# Patient Record
Sex: MALE | Race: WHITE | NOT HISPANIC OR LATINO
[De-identification: names, ages, dates, MRNs, and addresses within clinical notes are randomized per-mention and may not be internally consistent; named-entity substitution may affect disease eponyms.]

---

## 2017-12-21 ENCOUNTER — APPOINTMENT (OUTPATIENT)
Dept: SURGICAL ONCOLOGY | Facility: CLINIC | Age: 62
End: 2017-12-21
Payer: COMMERCIAL

## 2017-12-21 VITALS
HEART RATE: 54 BPM | SYSTOLIC BLOOD PRESSURE: 115 MMHG | RESPIRATION RATE: 14 BRPM | OXYGEN SATURATION: 98 % | WEIGHT: 213 LBS | DIASTOLIC BLOOD PRESSURE: 72 MMHG | HEIGHT: 73 IN | BODY MASS INDEX: 28.23 KG/M2

## 2017-12-21 PROCEDURE — 99214 OFFICE O/P EST MOD 30 MIN: CPT

## 2018-05-04 ENCOUNTER — TRANSCRIPTION ENCOUNTER (OUTPATIENT)
Age: 63
End: 2018-05-04

## 2019-01-04 ENCOUNTER — TRANSCRIPTION ENCOUNTER (OUTPATIENT)
Age: 64
End: 2019-01-04

## 2019-02-19 ENCOUNTER — APPOINTMENT (OUTPATIENT)
Dept: SURGICAL ONCOLOGY | Facility: CLINIC | Age: 64
End: 2019-02-19
Payer: COMMERCIAL

## 2019-02-19 VITALS
HEIGHT: 73 IN | DIASTOLIC BLOOD PRESSURE: 77 MMHG | BODY MASS INDEX: 31.81 KG/M2 | SYSTOLIC BLOOD PRESSURE: 152 MMHG | OXYGEN SATURATION: 98 % | HEART RATE: 68 BPM | WEIGHT: 240 LBS

## 2019-02-19 DIAGNOSIS — C43.61 MALIGNANT MELANOMA OF RIGHT UPPER LIMB, INCLUDING SHOULDER: ICD-10-CM

## 2019-02-19 PROCEDURE — 99214 OFFICE O/P EST MOD 30 MIN: CPT

## 2019-02-19 NOTE — HISTORY OF PRESENT ILLNESS
[de-identified] : Bello is a 63 year old male presents for a yearly melanoma follow up visit. History significant for resection of right forearm T1 (0.2 mm) melanoma in December 2011.  Final pathology revealed negative margins. \par \par INTERVAL HISTORY:\par 2/19/19 -  He continues ongoing dermatologic surveillance with Dr. Stearns and reports exam in November 2018 was unremarkable. Denies new skin lesions or masses. Denies bleeding or pruritic moles. Reports a dry patch of skin on his left forehead.  He continues to avoid the sun and applies sunblock.  He was diagnosed with PLS.  \par \par Derm: Dr. Stearns

## 2019-02-19 NOTE — ASSESSMENT
[FreeTextEntry1] : IMP:\par S/p resection of right forearm T1 (0.2 mm) melanoma in December 2011\par No clinical evidence of local or regional recurrence\par \par \par PLAN:\par RTO PRN\par Continue ongoing dermatological surveillance with Dr. Stearns

## 2019-02-19 NOTE — PHYSICAL EXAM
[Normal] : supple, no neck mass and thyroid not enlarged [Normal Neck Lymph Nodes] : normal neck lymph nodes  [Normal Supraclavicular Lymph Nodes] : normal supraclavicular lymph nodes [Normal Axillary Lymph Nodes] : normal axillary lymph nodes [Normal] : oriented to person, place and time, with appropriate affect [de-identified] : no evidence of local recurrence right forearm; well healed incision

## 2019-06-20 ENCOUNTER — APPOINTMENT (OUTPATIENT)
Dept: ORTHOPEDIC SURGERY | Facility: CLINIC | Age: 64
End: 2019-06-20
Payer: COMMERCIAL

## 2019-06-20 VITALS — HEIGHT: 74 IN | WEIGHT: 236 LBS | BODY MASS INDEX: 30.29 KG/M2

## 2019-06-20 VITALS — WEIGHT: 236 LBS | BODY MASS INDEX: 30.29 KG/M2 | HEIGHT: 74 IN

## 2019-06-20 PROCEDURE — 73562 X-RAY EXAM OF KNEE 3: CPT | Mod: LT

## 2019-06-20 PROCEDURE — 99204 OFFICE O/P NEW MOD 45 MIN: CPT

## 2019-06-20 NOTE — DISCUSSION/SUMMARY
[de-identified] : We talked about the nature of the condition and treatment options. At this point I a recommending therapy. A prescription for Physical Therapy was provided. Home exercises for ROM, strength and flexibility were reviewed. The benefits of hyaluronic acid injections were reviewed. Ice and anti-inflammatories were discussed. viscosupplimentation is a possibility. \par \par FU in 4 weeks

## 2019-06-20 NOTE — PHYSICAL EXAM
[de-identified] : Right Lower Extremity\par o Buttock : no tenderness, swelling or deformities \par o Right Hip :\par ¦ Inspection/Palpation : no tenderness, swelling or deformities\par ¦ Range of Motion : Full felxion, limited rotation\par ¦ Stability : joint stability intact\par ¦ Strength : extension, flexion, adduction, abduction, internal rotation and external rotation 3-/5 \par o Right Knee :\par ¦ Inspection/Palpation : no tenderness to palpation, no swelling\par ¦ Range of Motion : Actively he can almost get to full extension, full flexion/extension passively, no crepitance\par ¦ Stability : no valgus or varus instability present on provocative testing\par ¦ Strength : flexion and extension 5/5\par ¦ Tests and Signs : all tests for stability normal \par \par Left Lower Extremity\par o Buttock : no tenderness, swelling or deformities \par o Left Hip :\par ¦ Inspection/Palpation : no tenderness, no swelling or deformities\par ¦ Range of Motion : full and painless in all planes, no crepitance\par ¦ Stability : joint stability intact\par ¦ Strength : extension, flexion, adduction, abduction, internal rotation and external rotation 4+/5\par o Left Knee :\par ¦ Inspection/Palpation : no tenderness to palpation, no swelling\par ¦ Range of Motion : active flexion and extension full and painless, mild  crepitance\par ¦ Stability : no valgus or varus instability present on provocative testing\par ¦ Strength : flexion and extension 4+/5\par ¦ Tests and Signs : all tests for stability normal\par \par Gait:\par ambulating with a quad cane on the left, AFO on the right foot., tight hamstrings and quads  on the left>right.  \par \par Radiology Results\par o Left knee: AP, lateral, and skyline and tunnel views show significant lateral and patellofemoral osteoarthritis

## 2019-06-20 NOTE — HISTORY OF PRESENT ILLNESS
[de-identified] : 64 year old male with a history of primary lateral sclerosis that was diagnosed 1.5 years ago, presents with pain in left knee onset 6 months ago. He experiences no pain at night while he sleeps. No pain with walking, but experiences pain going up stairs. However, it takes him a while to get moving when he's been sitting for an extended period of time. He feels as if the knee can buckle if he walks for too long, and is experiencing extreme difficulty moving his left leg, as well as issues with spasticity. He is attending PT for the pain, but feels as if it hasn't helped significantly. He has a has had a cardiac ablation, and takes metoprolol. He ambulates with a cane on the left with AFO on the right foot.

## 2019-06-20 NOTE — ADDENDUM
[FreeTextEntry1] : I, Catalino Toy, acted solely as a scribe for Dr. Bobby Guallpa on this date 06/20/2019. \par \par All medical record entries made by the Scribe were at my, Dr. Bobby Guallpa, direction and personally dictated by me on 06/20/2019. I have reviewed the chart and agree that the record accurately reflects my personal performance of the history, physical exam, assessment and plan. I have also personally directed, reviewed, and agreed with the chart.

## 2019-07-03 ENCOUNTER — APPOINTMENT (OUTPATIENT)
Dept: ORTHOPEDIC SURGERY | Facility: CLINIC | Age: 64
End: 2019-07-03
Payer: COMMERCIAL

## 2019-07-03 VITALS — BODY MASS INDEX: 30.29 KG/M2 | HEIGHT: 74 IN | WEIGHT: 236 LBS

## 2019-07-03 PROCEDURE — 73030 X-RAY EXAM OF SHOULDER: CPT | Mod: RT

## 2019-07-03 PROCEDURE — 99214 OFFICE O/P EST MOD 30 MIN: CPT

## 2019-07-03 NOTE — HISTORY OF PRESENT ILLNESS
[de-identified] : 64 year old male presents with right shoulder pain. He states that he fell 7 weeks ago, where he extended his right arm to lift himself from the floor. The chair he was grabbing to lift himself up slid away, and ever since he's been feeling shoulder pain. He denies any ecchymosis at the time of the injury. He states that it initially felt weak, but progressively starting hurting. It hurts him especially at night when he is attempting to sleep. He ambulates with a cane in his left hand.

## 2019-07-03 NOTE — PHYSICAL EXAM
[de-identified] : Constitutional\par o Appearance : well-nourished, well developed, alert, in no acute distress\par Head and Face\par o Head :\par ¦ Inspection : atraumatic, normocephalic\par o Face :\par ¦ Inspection : no visible rash or discoloration\par Respiratory\par o Respiratory Effort: breathing unlabored\par Neurologic\par o Sensation : Normal sensation\par Psychiatric\par o Mood and Affect: mood normal, affect appropriate\par Lymphatic\par o Additional Nodes : No palpable lymph nodes present\par \par Cervical Spine\par o Inspection/Palpation :\par ¦ Inspection : alignment midline, normal degree of lordosis present\par ¦ Skin : normal appearance, no masses, trachea midline\par ¦ Palpation : musculature is nontender to palpation.\par o Range of Motion : full flexion and rotation of the cervical spine, no crepitance or pain with ROM\par Tests: Negative Spurling’s test\par Reflexes: Biceps/Triceps 2+ bilaterally\par \par \par Right Upper Extremity\par o Right Shoulder :\par ¦ Inspection/Palpation : no anterior capsular, or AC joint tenderness, no bicep rupture, no swelling or deformities\par ¦ Range of Motion : slightly limited forward flexion with discomfort, pain and weakness with internal rotation, no crepitance\par ¦ Strength : forward elevation, internal rotation, external rotation, external rotation at 90 degrees of abduction, \par adduction and abduction 5/5 forward flexion, supraspinatus and abduction is 4-/5\par ¦ Stability : no joint instability on provocative testing\par Tests: Luke negative, Neer negative, negative drop arm test\par \par Left Upper Extremity\par o Left Shoulder :\par ¦ Inspection/Palpation : no tenderness, swelling or deformities\par ¦ Range of Motion : full and painless in all planes, no crepitance\par ¦ Strength : forward elevation, internal rotation, external rotation, external rotation at 90 degrees of abduction, supraspinatus,\par adduction and abduction 5/5\par ¦ Stability : no joint instability on provocative testing\par Tests: Luke negative, negative Neer and Myesha test, negative drop arm test\par \par Gait and Station:\par Gait: gait normal, no significant extremity swelling or lymphedema, good proprioception and balance\par \par Radiology Results\par o Right Shoulder : AP, internal and external rotation and outlet views were obtained and reveal downsloping acromion with sclerosis of greater tuberosity and mild arthritis of the glenohumeral joint\par

## 2019-07-03 NOTE — ADDENDUM
[FreeTextEntry1] : I, Amish Glass, acted solely as a scribe for Dr. Bobby Guallpa on this date 07/03/2019 \par All medical record entries made by the Scribe were at my, Dr. Bobby Guallpa, direction and personally dictated by me on 07/03/2019 . I have reviewed the chart and agree that the record accurately reflects my personal performance of the history, physical exam, assessment and plan. I have also personally directed, reviewed, and agreed with the chart.

## 2019-07-03 NOTE — DISCUSSION/SUMMARY
[de-identified] : I discussed the underlying pathophysiology of the patient's condition in great detail with the patient. I reviewed the patient's x-rays with him in great detail.  I am ordering an MRI of his right shoulder to determine whether he has a rotator cuff tear.  I instructed the patient to continue with ROM exercises such as walking his hand up and down a wall, and moving his arm around in a circular motion. He is to avoid lifting objects over his head, and extending his shoulder. The use of ice and rest was reviewed with the patient. \par \par FU after MRI

## 2019-07-10 ENCOUNTER — FORM ENCOUNTER (OUTPATIENT)
Age: 64
End: 2019-07-10

## 2019-07-11 ENCOUNTER — OUTPATIENT (OUTPATIENT)
Dept: OUTPATIENT SERVICES | Facility: HOSPITAL | Age: 64
LOS: 1 days | End: 2019-07-11
Payer: COMMERCIAL

## 2019-07-11 ENCOUNTER — APPOINTMENT (OUTPATIENT)
Dept: MRI IMAGING | Facility: CLINIC | Age: 64
End: 2019-07-11
Payer: COMMERCIAL

## 2019-07-11 DIAGNOSIS — Z00.8 ENCOUNTER FOR OTHER GENERAL EXAMINATION: ICD-10-CM

## 2019-07-11 PROCEDURE — 73221 MRI JOINT UPR EXTREM W/O DYE: CPT | Mod: 26,RT

## 2019-07-11 PROCEDURE — 73221 MRI JOINT UPR EXTREM W/O DYE: CPT

## 2019-07-22 ENCOUNTER — APPOINTMENT (OUTPATIENT)
Dept: ORTHOPEDIC SURGERY | Facility: CLINIC | Age: 64
End: 2019-07-22
Payer: COMMERCIAL

## 2019-07-22 PROCEDURE — 99214 OFFICE O/P EST MOD 30 MIN: CPT

## 2019-08-28 ENCOUNTER — APPOINTMENT (OUTPATIENT)
Dept: ORTHOPEDIC SURGERY | Facility: CLINIC | Age: 64
End: 2019-08-28
Payer: COMMERCIAL

## 2019-08-28 VITALS — WEIGHT: 233 LBS | BODY MASS INDEX: 29.9 KG/M2 | HEIGHT: 74 IN

## 2019-08-28 PROCEDURE — 99214 OFFICE O/P EST MOD 30 MIN: CPT

## 2019-09-10 ENCOUNTER — TRANSCRIPTION ENCOUNTER (OUTPATIENT)
Age: 64
End: 2019-09-10

## 2019-09-30 ENCOUNTER — TRANSCRIPTION ENCOUNTER (OUTPATIENT)
Age: 64
End: 2019-09-30

## 2019-10-14 ENCOUNTER — APPOINTMENT (OUTPATIENT)
Dept: ORTHOPEDIC SURGERY | Facility: CLINIC | Age: 64
End: 2019-10-14
Payer: COMMERCIAL

## 2019-10-14 VITALS — WEIGHT: 233 LBS | BODY MASS INDEX: 29.9 KG/M2 | HEIGHT: 74 IN

## 2019-10-14 PROCEDURE — 99214 OFFICE O/P EST MOD 30 MIN: CPT

## 2019-10-28 ENCOUNTER — TRANSCRIPTION ENCOUNTER (OUTPATIENT)
Age: 64
End: 2019-10-28

## 2019-11-06 ENCOUNTER — TRANSCRIPTION ENCOUNTER (OUTPATIENT)
Age: 64
End: 2019-11-06

## 2019-11-20 ENCOUNTER — TRANSCRIPTION ENCOUNTER (OUTPATIENT)
Age: 64
End: 2019-11-20

## 2019-11-25 ENCOUNTER — APPOINTMENT (OUTPATIENT)
Dept: ORTHOPEDIC SURGERY | Facility: CLINIC | Age: 64
End: 2019-11-25
Payer: COMMERCIAL

## 2019-11-25 VITALS — BODY MASS INDEX: 29.9 KG/M2 | HEIGHT: 74 IN | WEIGHT: 233 LBS

## 2019-11-25 DIAGNOSIS — M76.899 OTHER SPECIFIED ENTHESOPATHIES OF UNSPECIFIED LOWER LIMB, EXCLUDING FOOT: ICD-10-CM

## 2019-11-25 DIAGNOSIS — M75.111 INCOMPLETE ROTATOR CUFF TEAR OR RUPTURE OF RIGHT SHOULDER, NOT SPECIFIED AS TRAUMATIC: ICD-10-CM

## 2019-11-25 DIAGNOSIS — M17.12 UNILATERAL PRIMARY OSTEOARTHRITIS, LEFT KNEE: ICD-10-CM

## 2019-11-25 PROCEDURE — 99214 OFFICE O/P EST MOD 30 MIN: CPT

## 2019-11-25 NOTE — HISTORY OF PRESENT ILLNESS
[de-identified] : 64 year old male presents with right shoulder and left leg pain. He states that he experiences pain in his right shoulder, especially with ROM. He currently ambulates with a walker and an AFO brace on his right ankle and is present with his wife.  He is thrilled with his progress in PT and feels that it is slowing the progression of his neuromuscular disease. He would like to continue PT for improvement of his balance.\par \par X-Rays taken in July 2019 of his right shoulder revealed a downsloping acromion sclerosis of the greater tuberosity and arthritis

## 2019-11-25 NOTE — ADDENDUM
[FreeTextEntry1] : I, Amish Glass, acted solely as a scribe for Dr. Bobby Guallpa on this date 11/25/2019 \par All medical record entries made by the Scribe were at my, Dr. Bobby Guallpa, direction and personally dictated by me on 11/25/2019 . I have reviewed the chart and agree that the record accurately reflects my personal performance of the history, physical exam, assessment and plan. I have also personally directed, reviewed, and agreed with the chart.

## 2019-11-25 NOTE — PHYSICAL EXAM
[de-identified] : Constitutional\par o Appearance : well-nourished, well developed, alert, in no acute distress \par Head and Face\par o Head :\par ¦ Inspection : atraumatic, normocephalic\par o Face :\par ¦ Inspection : no visible rash or discoloration\par Respiratory\par o Respiratory Effort: breathing unlabored \par Neurologic\par o Sensation : Normal sensation \par Psychiatric\par o Mood and Affect: mood normal, affect appropriate \par Lymphatic\par o Additional Nodes : No palpable lymph nodes present \par \par o Cervical Spine\par ¦ Inspection/Palpation : alignment midline, normal degree of lordosis present, musculature is nontender to palpation,\par ¦ Range of Motion : arc of motion full in all planes, no crepitance or pain with ROM\par ¦ Skin : normal appearance, no masses or tenderness, trachea midline\par Tests: Negative Spurling’s test\par \par Right Upper Extremity\par o Right Shoulder :\par ¦ Inspection/Palpation : no anterior capsular tenderness or AC joint tenderness, no swelling or deformities, no biceps rupture\par ¦ Range of Motion : slight limitation of internal rotation with discomfort at the extremes, full external rotation, no crepitance\par ¦ Strength :  forward elevation, internal rotation, external rotation, adduction,  external rotation at 90 degrees of abduction, supraspinatus, forward flexion and abduction 4+/5, biceps/triceps, 5/5\par ¦ Stability : no joint instability on provocative testing \par Tests: Luke negative, Neer negative, negative drop arm test\par \par Left Upper Extremity\par o Left Shoulder :\par ¦ Inspection/Palpation : no tenderness, swelling or deformities\par ¦ Range of Motion : full and painless in all planes, no crepitance\par ¦ Strength :  forward elevation, internal rotation 5/5, external rotation 5/5, external rotation at 90 degrees of abduction, supraspinatus, adduction and abduction, biceps/triceps, 5/5\par ¦ Stability : no joint instability on provocative testing\par  Tests: Luke negative, negative Neer and Myesha test, negative drop arm test\par \par Right Lower Extremity \par o Buttock : no tenderness, swelling or deformities\par o Right Hip :\par ¦ Inspection/Palpation : no tenderness, no swelling or deformities\par ¦ Range of Motion : full and painless in all planes, no crepitance\par ¦ Stability : joint stability intact\par ¦ Strength : extension, flexion, adduction, abduction, internal rotation and external rotation, 4-/5\par Tests: Danis’s test negative \par \par o Right Knee :\par ¦ Inspection/Palpation : no tenderness to palpation, no swelling,wearing an AFO\par ¦ Range of Motion : active flexion and extension full and painless, no crepitance\par ¦ Stability : no valgus or varus instability present on provocative testing\par ¦ Strength : flexion and extension 4/5\par ¦ Tests and Signs : negative Anterior Drawer, negative Lachman, negative Ilda\par \par \par Left Lower Extremity\par o Buttock : no tenderness, swelling or deformities \par o Left Hip :\par ¦ Inspection/Palpation : no tenderness, no swelling or deformities\par ¦ Range of Motion : full and painless in all planes, no crepitance\par ¦ Stability : joint stability intact\par ¦ Strength : extension, flexion, adduction, abduction, internal rotation and external rotation, 4/5\par  Tests: Danis’s test negative \par \par o Left Knee :\par ¦ Inspection/Palpation : no tenderness to palpation, no swelling\par ¦ Range of Motion : active flexion and extension full and painless, no crepitance\par ¦ Stability : no valgus or varus instability present on provocative testing\par ¦ Strength : flexion and extension 4/5\par ¦ Tests and Signs : negative Anterior Drawer, negative Lachman, negative Ilda \par \par Gait and Station:\par Gait: Walks with a quad cane with the AFO on the right side and is slow broad-based gait, no significant extremity swelling or lymphedema, limited  proprioception and balance

## 2019-11-25 NOTE — DISCUSSION/SUMMARY
[de-identified] : I discussed the underlying pathophysiology of the patient's condition in great detail with the patient. I went over the patient's x-rays with them in great detail. We discussed the extent of his arthritis in great length. I recommend the patient attend PT to further increase their strength and mobility. A prescription for Physical Therapy was provided to the patient. The use of ice and rest was reviewed with the patient. ROM exercises were discussed with the patient. \par \par FU in 4-6 weeks

## 2019-12-02 ENCOUNTER — APPOINTMENT (OUTPATIENT)
Dept: INTERNAL MEDICINE | Facility: CLINIC | Age: 64
End: 2019-12-02
Payer: COMMERCIAL

## 2019-12-02 VITALS
WEIGHT: 246 LBS | SYSTOLIC BLOOD PRESSURE: 138 MMHG | BODY MASS INDEX: 31.91 KG/M2 | DIASTOLIC BLOOD PRESSURE: 70 MMHG | HEIGHT: 73.5 IN | HEART RATE: 74 BPM | OXYGEN SATURATION: 98 %

## 2019-12-02 PROCEDURE — G0444 DEPRESSION SCREEN ANNUAL: CPT

## 2019-12-02 PROCEDURE — G0442 ANNUAL ALCOHOL SCREEN 15 MIN: CPT

## 2019-12-02 PROCEDURE — 99386 PREV VISIT NEW AGE 40-64: CPT

## 2019-12-09 NOTE — HEALTH RISK ASSESSMENT
[Yes] : Yes [Monthly or less (1 pt)] : Monthly or less (1 point) [Never (0 pts)] : Never (0 points) [Any fall with injury in past year] : Patient reported fall with injury in the past year [Assistive Device] : Patient uses an assistive device [1] : 2) Feeling down, depressed, or hopeless for several days (1) [Patient reported colonoscopy was normal] : Patient reported colonoscopy was normal [Change in mental status noted] : Change in mental status noted [With Patient/Caregiver] : With Patient/Caregiver [] : No [Audit-CScore] : 1 [de-identified] : cans, walker [[id number]] : 2 [[id number]] : 10 [Language] : denies difficulty with language [Behavior] : denies difficulty with behavior [Learning/Retaining New Information] : denies difficulty learning/retaining new information [Handling Complex Tasks] : denies difficulty handling complex tasks [ColonoscopyDate] : 01/14 [AdvancecareDate] : 12/2/2019

## 2019-12-09 NOTE — REVIEW OF SYSTEMS
[Recent Change In Weight] : ~T recent weight change [Unsteady Walk] : ataxia [Negative] : Heme/Lymph [de-identified] : see hpi

## 2019-12-09 NOTE — PHYSICAL EXAM
[No Accessory Muscle Use] : no accessory muscle use [Clear to Auscultation] : lungs were clear to auscultation bilaterally [Normal Percussion] : the chest was normal to percussion [Regular Rhythm] : with a regular rhythm [Normal S1, S2] : normal S1 and S2 [No Murmur] : no murmur heard [No Abdominal Bruit] : a ~M bruit was not heard ~T in the abdomen [No CVA Tenderness] : no CVA  tenderness [Pedal Pulses Present] : the pedal pulses are present [No Spinal Tenderness] : no spinal tenderness [No Joint Swelling] : no joint swelling [Normal] : affect was normal and insight and judgment were intact [Kyphosis] : no kyphosis [de-identified] : RLE foot drop [de-identified] : lifts his right hip/leg when walking, using two canes to assist, right foot drop, no atrphy of limbs, no fasiculations , BUE intact 5/5, Relex in right knee intact, left knee reflex depressed [de-identified] : scrape on pulp of one finger after fall [Scoliosis] : no scoliosis

## 2019-12-09 NOTE — HISTORY OF PRESENT ILLNESS
[de-identified] : Here with wife.\par 65 yo white male,  at [place] [employer], with presumed PLS diagnosed in 2018, here to establish care.\par He retired from his job June 2019.\par \par He presented in summer of 2017 with spasticity of RLE, that progressed to weakness in RLE and foot drop in 2018. Saw neurologist Dr Josse Camacho who did workup including EMG/nerve conduction studies, MRI brain,and  MRI  entire spine with 2 minor herniated discs, and told he might have ALS. He was referred to Dr Gee, neurologist at  [facility], who confirmed he had right foot drop, repeated EMG/NCS, but felt he did not enough clinical data to support diagnosis of either ALS or PLS. He was referred to neurologist Dr Chavez at [facility] Feb 2019 who reviewed records, repeated EMG's/NCS and thought he had PLS . He was started on Riluzole and Baclofen. He notes now LLE becoming spastic since summer of 2019, similar to how the RLE presented in 2017.\par He denies bowel or urinary incontinence. He has unsteady gait, requiring 2 canes to support him or walker He uses right foot brace when walking. Has fallen twice, scraping a finger, and required 9 stitches on finger. Able to navigate stairs.\par \par PMH:\par melanoma/BCC\par Kidney stones\par SVT 2009, s/p ablation July 2009 \par PAF-on aspirin, \par

## 2019-12-10 ENCOUNTER — APPOINTMENT (OUTPATIENT)
Dept: INTERNAL MEDICINE | Facility: CLINIC | Age: 64
End: 2019-12-10

## 2019-12-10 ENCOUNTER — TRANSCRIPTION ENCOUNTER (OUTPATIENT)
Age: 64
End: 2019-12-10

## 2019-12-12 ENCOUNTER — APPOINTMENT (OUTPATIENT)
Dept: PHYSICAL MEDICINE AND REHAB | Facility: CLINIC | Age: 64
End: 2019-12-12

## 2019-12-23 ENCOUNTER — CHART COPY (OUTPATIENT)
Age: 64
End: 2019-12-23

## 2019-12-23 ENCOUNTER — APPOINTMENT (OUTPATIENT)
Dept: NEUROLOGY | Facility: CLINIC | Age: 64
End: 2019-12-23
Payer: COMMERCIAL

## 2019-12-23 ENCOUNTER — APPOINTMENT (OUTPATIENT)
Dept: PHYSICAL MEDICINE AND REHAB | Facility: CLINIC | Age: 64
End: 2019-12-23
Payer: COMMERCIAL

## 2019-12-23 VITALS
SYSTOLIC BLOOD PRESSURE: 138 MMHG | BODY MASS INDEX: 32.6 KG/M2 | HEART RATE: 67 BPM | WEIGHT: 246 LBS | HEIGHT: 73 IN | DIASTOLIC BLOOD PRESSURE: 77 MMHG

## 2019-12-23 VITALS
SYSTOLIC BLOOD PRESSURE: 132 MMHG | DIASTOLIC BLOOD PRESSURE: 80 MMHG | HEART RATE: 55 BPM | OXYGEN SATURATION: 95 % | BODY MASS INDEX: 32.6 KG/M2 | HEIGHT: 73 IN | WEIGHT: 246 LBS

## 2019-12-23 PROCEDURE — 99204 OFFICE O/P NEW MOD 45 MIN: CPT | Mod: GC

## 2019-12-23 PROCEDURE — 99205 OFFICE O/P NEW HI 60 MIN: CPT

## 2019-12-23 RX ORDER — ATORVASTATIN CALCIUM 10 MG/1
10 TABLET, FILM COATED ORAL
Qty: 90 | Refills: 0 | Status: COMPLETED | COMMUNITY
Start: 2017-10-21 | End: 2019-12-23

## 2019-12-23 RX ORDER — METOPROLOL SUCCINATE 50 MG/1
50 TABLET, EXTENDED RELEASE ORAL
Qty: 30 | Refills: 0 | Status: COMPLETED | COMMUNITY
Start: 2017-12-05 | End: 2019-12-23

## 2019-12-23 NOTE — ASSESSMENT
[FreeTextEntry1] : Patient's history and examination meet [place] criteria for definite ALS.  He has bulbar dysfunction by tongue fascics and compromised PFT's.  His arms and legs manifest both UMN and LMN signs on exam.  \par \par Will continue riluzole and offer to see him at next available ALS clinic here.  I have offered radicava treatment and he and his wife will discuss and let me know.  \par \par I have asked for copies of previous EMG's although that is for completeness and not to confirm the diagnosis.

## 2019-12-23 NOTE — REVIEW OF SYSTEMS
[Negative] : Heme/Lymph [Patient Intake Form Reviewed] : Patient intake form was reviewed [FreeTextEntry9] : Per HPI [de-identified] : History of skin cancer [de-identified] : Per HPI

## 2019-12-23 NOTE — HISTORY OF PRESENT ILLNESS
[FreeTextEntry1] : Mr. Dumont is a 64 year old man here for initial evaluation for gait disturbance. He has significant medical history of PLS. Reports main issue is balance. Initial symptoms of PLS started about two years ago with right leg weakness and spasticity. This past summer started feeling spasms in his left leg as well. Has had 3 falls in the past 6 months, one over the summer with right RTC tear and one six weeks ago with finger laceration. Reports many near falls, nearly every time he gets up. Feels it is mostly due to balance. He currently uses an AFO made by his podiatrist 1.5 years ago with DF assist, no posterior stop, and a footplate to the metatarsal heads. Feels it may be a little loose but otherwise no complaint. He uses a quad cane in the left hand and a hiking pole in the right hand for ambulation. Owns rollator but doesn't use it as much because it is harder to get out of the car. Going to PT 3x/wk working on ambulation and right shoulder.

## 2019-12-23 NOTE — PHYSICAL EXAM
[FreeTextEntry1] : Gen - NAD, Comfortable\par HEENT - NCAT, EOMI, MMM\par Neck - Supple, No limited ROM\par Pulm - No wheeze, No rhonchi, No crackles\par Cardiovascular - Well perfused\par Abdomen - Soft, NT/ND\par Extremities - No C/C/E, No calf tenderness\par Neuro-\par    Cognitive - AAOx3\par    Communication - Fluent, No dysarthria  \par    Cranial Nerves - CN 2-12 intact\par    Motor - \par                   LEFT    UE - ShAB 5/5, EF 5/5, EE 5/5, WE 5/5,  5/5\par                   RIGHT UE - ShAB 4+/5, EF 5/5, EE 5/5, WE 5/5,  5/5\par                   LEFT    LE - HF 5/5, KE 5/5, DF 5/5, PF 5/5, IV 5/5, EV 5/5\par                   RIGHT LE - HF 3-/5, KE 4+/5, DF 1/5, PF 4/5, IV 4+/5, EV 4-/5   \par    Sensory - Intact to LT, Good dull/sharp discrimination\par    Reflexes - L KJ 2+-3, L AJ diminished 0-1+; R KJ/AJ 1+; upgoing babinski RLE\par    Coordination - FTN intact\par    Tone - MAS 1 right quads, MAS 1+2 right gastroc; Left hamstring trace catch; No clonus bilaterally\par Psychiatric - Mood stable, Affect WNL\par Shoulder: Full ROM bilaterally,  - bustamante on right\par Ankle: Left DF 5-10 deg above neutral with knee extension; Right DF with flexed knee able to get to neutral, with knee extended 1-2 deg off from neutral. \par Gait: Ambulates with two point gait with quad cane in left hand and hiking pole in right hand. Without brace patient with uncompensated gluteus medius gait on right, toe strike on right foot with some inversion. No genu recuvatum. With AFO patient with right foot slap although does have heel strike, still with some inversion. Right swing phase leg locked in extension/5deg flexion\par + Trendelenberg on right\par

## 2019-12-23 NOTE — HISTORY OF PRESENT ILLNESS
[FreeTextEntry1] : Patient presents for evaluation of PLS vs. ALS.  He has seen at least three neurologists who have differed slightly in their diagnoses but the most recent, Dr. Chavez, started him on riluzole and baclofen.  Patient states that in early 2017 he started having trouble with walking and developed right foot drop.  This progressed proximal RLE weakness with stiffness, and since this past summer he has had new left leg stiffness. He denies weakness or stiffness in the arms.  He denies SOB or trouble swallowing, denies diplopia.  He gets painful cramps in legs and he has muscle twitching in legs and right arm and chest and on his back.  No speech changes.  He notes no change from the baclofen. He continues to take riluzole.  He has fallen three times this past year.  \par \par Denies FH of ALS\par \par He has had C spine imaging that he reports was benign.

## 2019-12-23 NOTE — ASSESSMENT
[FreeTextEntry1] : Mr. Chavez is a 64 year old man here with gait abnormality due to PLS. Patient current AFO is inadequately controlling foot drop and inversion, needs a posterior stop to ensure heel strike and a longer foot plate. Provided script for new right custom molded AFO with [place] ankle joints, APS, instep strap, full footplate and flexible toe, see Rx. Also recommend patient use of a rollator walker during ambulation to improve balance and decrease rate of falls/near falls. Provided updated PT script to focus on balance and ambulation, and for gait training with new AFO and rollator, see RX. Patient to follow up after delivery of right  AFO.

## 2019-12-23 NOTE — PHYSICAL EXAM
[General Appearance - Alert] : alert [General Appearance - In No Acute Distress] : in no acute distress [Person] : oriented to person [Time] : oriented to time [Short Term Intact] : short term memory intact [Place] : oriented to place [Registration Intact] : recent registration memory intact [Remote Intact] : remote memory intact [Concentration Intact] : normal concentrating ability [Visual Intact] : visual attention was ~T not ~L decreased [Naming Objects] : no difficulty naming common objects [Repeating Phrases] : no difficulty repeating a phrase [Writing A Sentence] : no difficulty writing a sentence [Fluency] : fluency intact [Comprehension] : comprehension intact [Cranial Nerves Optic (II)] : visual acuity intact bilaterally,  visual fields full to confrontation, pupils equal round and reactive to light [Past History] : adequate knowledge of personal past history [Reading] : reading intact [Cranial Nerves Oculomotor (III)] : extraocular motion intact [Cranial Nerves Trigeminal (V)] : facial sensation intact symmetrically [Cranial Nerves Facial (VII)] : face symmetrical [Cranial Nerves Vestibulocochlear (VIII)] : hearing was intact bilaterally [Cranial Nerves Glossopharyngeal (IX)] : tongue and palate midline [Cranial Nerves Accessory (XI - Cranial And Spinal)] : head turning and shoulder shrug symmetric [Motor Handedness Right-Handed] : the patient is right hand dominant [Sensation Tactile Decrease] : light touch was intact [Balance] : balance was intact [2+] : Ankle jerk right 2+ [Hand Weakness Right] : the hand  was weak [-4] : ankle dorsiflexion -4/5 [4] : ankle inversion 4/5 [5] : ankle inversion 5/5 [+4] : ankle eversion +4/5 [Proprioception] : proprioception was intact [Vibration Decrease Leg / Foot Right Toes] : decreased at the toes of the right foot [3+] : Brachioradialis left 3+ [0] : Ankle jerk left 0 [4+] : Patella left 4+ [Plantar Reflex Right Only] : abnormal on the right [Plantar Reflex Left Only] : abnormal on the left [Neck Appearance] : the appearance of the neck was normal [Neck Cervical Mass (___cm)] : no neck mass was observed [Thyroid Diffuse Enlargement] : the thyroid was not enlarged [] : no respiratory distress [Thyroid Nodule] : there were no palpable thyroid nodules [Jugular Venous Distention Increased] : there was no jugular-venous distention [Auscultation Breath Sounds / Voice Sounds] : lungs were clear to auscultation bilaterally [Heart Rate And Rhythm] : heart rate was normal and rhythm regular [Heart Sounds Gallop] : no gallops [Heart Sounds] : normal S1 and S2 [Murmurs] : no murmurs [Heart Sounds Pericardial Friction Rub] : no pericardial rub [Hand Weakness Left] : normal hand  [Vibration Decrease Leg / Foot Left Toes] : normal  at the toes of the left foot [Romberg's Sign] : Romberg's sign was negtive [Past-pointing] : there was no past-pointing [Tremor] : no tremor present [___] : absent on the right [___] : absent on the left [FreeTextEntry8] : spastic gait [FreeTextEntry5] : slow tongue movements with scant tongue fascics noted [FreeTextEntry6] : atrophy right more than left post deltoids, fascics noted back, right proximal arm and calves.  Tone MAS RLE 3/4, LLE 2/4.  PFTs: FVC 3.6, FEV1 3.3 82% pred [FreeTextEntry9] : bilateral pectorals, right with spread

## 2020-01-03 ENCOUNTER — MEDICATION RENEWAL (OUTPATIENT)
Age: 65
End: 2020-01-03

## 2020-01-09 ENCOUNTER — MEDICATION RENEWAL (OUTPATIENT)
Age: 65
End: 2020-01-09

## 2020-01-22 ENCOUNTER — APPOINTMENT (OUTPATIENT)
Dept: ORTHOPEDIC SURGERY | Facility: CLINIC | Age: 65
End: 2020-01-22
Payer: COMMERCIAL

## 2020-01-22 VITALS — BODY MASS INDEX: 32.6 KG/M2 | HEIGHT: 73 IN | WEIGHT: 246 LBS

## 2020-01-22 DIAGNOSIS — M19.019 PRIMARY OSTEOARTHRITIS, UNSPECIFIED SHOULDER: ICD-10-CM

## 2020-01-22 PROCEDURE — 99213 OFFICE O/P EST LOW 20 MIN: CPT

## 2020-03-09 ENCOUNTER — APPOINTMENT (OUTPATIENT)
Dept: NEUROLOGY | Facility: CLINIC | Age: 65
End: 2020-03-09
Payer: MEDICARE

## 2020-03-09 VITALS
TEMPERATURE: 98.4 F | SYSTOLIC BLOOD PRESSURE: 139 MMHG | WEIGHT: 245 LBS | BODY MASS INDEX: 32.47 KG/M2 | RESPIRATION RATE: 16 BRPM | DIASTOLIC BLOOD PRESSURE: 76 MMHG | HEART RATE: 80 BPM | HEIGHT: 73 IN

## 2020-03-09 PROCEDURE — 97162 PT EVAL MOD COMPLEX 30 MIN: CPT | Mod: GP

## 2020-03-09 PROCEDURE — 97166 OT EVAL MOD COMPLEX 45 MIN: CPT | Mod: GO

## 2020-03-09 PROCEDURE — 99215 OFFICE O/P EST HI 40 MIN: CPT | Mod: 25

## 2020-03-09 PROCEDURE — 92610 EVALUATE SWALLOWING FUNCTION: CPT

## 2020-03-09 NOTE — PHYSICAL EXAM
[Person] : oriented to person [Place] : oriented to place [Time] : oriented to time [Short Term Intact] : short term memory intact [Remote Intact] : remote memory intact [Registration Intact] : recent registration memory intact [Concentration Intact] : normal concentrating ability [Visual Intact] : visual attention was ~T not ~L decreased [Naming Objects] : no difficulty naming common objects [Repeating Phrases] : no difficulty repeating a phrase [Writing A Sentence] : no difficulty writing a sentence [Fluency] : fluency intact [Comprehension] : comprehension intact [Reading] : reading intact [Past History] : adequate knowledge of personal past history [Cranial Nerves Optic (II)] : visual acuity intact bilaterally,  visual fields full to confrontation, pupils equal round and reactive to light [Cranial Nerves Oculomotor (III)] : extraocular motion intact [Cranial Nerves Trigeminal (V)] : facial sensation intact symmetrically [Cranial Nerves Facial (VII)] : face symmetrical [Cranial Nerves Vestibulocochlear (VIII)] : hearing was intact bilaterally [Cranial Nerves Glossopharyngeal (IX)] : tongue and palate midline [Cranial Nerves Accessory (XI - Cranial And Spinal)] : head turning and shoulder shrug symmetric [Motor Handedness Right-Handed] : the patient is right hand dominant [Hand Weakness Right] : the hand  was weak [-4] : ankle dorsiflexion -4/5 [4] : ankle inversion 4/5 [+4] : ankle eversion +4/5 [5] : ankle inversion 5/5 [Sensation Tactile Decrease] : light touch was intact [Proprioception] : proprioception was intact [Vibration Decrease Leg / Foot Right Toes] : decreased at the toes of the right foot [Balance] : balance was intact [3+] : Brachioradialis left 3+ [4+] : Patella left 4+ [2+] : Ankle jerk right 2+ [0] : Ankle jerk left 0 [Plantar Reflex Right Only] : abnormal on the right [Plantar Reflex Left Only] : abnormal on the left [Hand Weakness Left] : normal hand  [Romberg's Sign] : Romberg's sign was negtive [Vibration Decrease Leg / Foot Left Toes] : normal  at the toes of the left foot [Past-pointing] : there was no past-pointing [Tremor] : no tremor present [___] : absent on the right [___] : absent on the left [FreeTextEntry5] : slow tongue movements with scant tongue fascics noted [FreeTextEntry6] : atrophy right more than left post deltoids, fascics noted back, right proximal arm and calves.  Tone MAS RLE 3/4, LLE 2/4.  ALS-FRS 42/48.  FEV1 2.9, 92% [FreeTextEntry8] : spastic gait [FreeTextEntry9] : bilateral pectorals, right with spread

## 2020-03-09 NOTE — HISTORY OF PRESENT ILLNESS
[FreeTextEntry1] : Patient presents for first multi-disciplinary ALS clinic.  Right leg is weaker, gets some muscle cramps that are painful.  \par \par Tolerating riluzole and just started radicava.  \par \par Using rollator at home and outside.  \par

## 2020-03-09 NOTE — ASSESSMENT
[FreeTextEntry1] : MATILDE ESCOBAR evaluated by me today  in multidisciplinary ALS clinic; requires evaluations today by PT/OT/speech and swallow therapists.  Social work needs addressed and goals of care reinforced. End of life care including health care proxy and patient wishes discussed.\par \par Nutrition/dietary needs discussed and addressed  \par \par Pulmonary function assessed by respiratory therapy and spirometry.  Patient’s respiratory function is good, FEV1 2.9, 92%\par \par Saliva management needs assessed\par \par PT/OT recommends:  No OT needs, continue outpatient PT, use right AFO as currently using\par \par Speech/Swallow therapy recommends: no recs, will follow\par \par Continue riluzole 50m BID, cont edaravone which just started.  \par \par f/u in 3 months at ALS clinic\par \par

## 2020-03-10 ENCOUNTER — APPOINTMENT (OUTPATIENT)
Dept: INTERNAL MEDICINE | Facility: CLINIC | Age: 65
End: 2020-03-10
Payer: MEDICARE

## 2020-03-10 VITALS
BODY MASS INDEX: 33.13 KG/M2 | DIASTOLIC BLOOD PRESSURE: 80 MMHG | OXYGEN SATURATION: 96 % | SYSTOLIC BLOOD PRESSURE: 124 MMHG | HEART RATE: 62 BPM | WEIGHT: 250 LBS | HEIGHT: 73 IN

## 2020-03-10 DIAGNOSIS — G89.29 LOW BACK PAIN: ICD-10-CM

## 2020-03-10 DIAGNOSIS — M54.5 LOW BACK PAIN: ICD-10-CM

## 2020-03-10 DIAGNOSIS — H00.019 HORDEOLUM EXTERNUM UNSPECIFIED EYE, UNSPECIFIED EYELID: ICD-10-CM

## 2020-03-10 PROCEDURE — 99214 OFFICE O/P EST MOD 30 MIN: CPT | Mod: 25

## 2020-03-10 PROCEDURE — G0009: CPT

## 2020-03-10 PROCEDURE — 90670 PCV13 VACCINE IM: CPT

## 2020-03-13 LAB
25(OH)D3 SERPL-MCNC: 41.9 NG/ML
ALBUMIN SERPL ELPH-MCNC: 4.3 G/DL
ALP BLD-CCNC: 61 U/L
ALT SERPL-CCNC: 31 U/L
ANION GAP SERPL CALC-SCNC: 14 MMOL/L
AST SERPL-CCNC: 37 U/L
BASOPHILS # BLD AUTO: 0.06 K/UL
BASOPHILS NFR BLD AUTO: 1.1 %
BILIRUB SERPL-MCNC: 0.6 MG/DL
BUN SERPL-MCNC: 14 MG/DL
CALCIUM SERPL-MCNC: 9.8 MG/DL
CHLORIDE SERPL-SCNC: 105 MMOL/L
CHOLEST SERPL-MCNC: 202 MG/DL
CHOLEST/HDLC SERPL: 3.2 RATIO
CO2 SERPL-SCNC: 24 MMOL/L
CREAT SERPL-MCNC: 0.87 MG/DL
EOSINOPHIL # BLD AUTO: 0.08 K/UL
EOSINOPHIL NFR BLD AUTO: 1.4 %
ESTIMATED AVERAGE GLUCOSE: 105 MG/DL
GLUCOSE SERPL-MCNC: 92 MG/DL
HBA1C MFR BLD HPLC: 5.3 %
HCT VFR BLD CALC: 47.9 %
HCV AB SER QL: NONREACTIVE
HCV S/CO RATIO: 0.14 S/CO
HDLC SERPL-MCNC: 63 MG/DL
HGB BLD-MCNC: 15.3 G/DL
IMM GRANULOCYTES NFR BLD AUTO: 0.2 %
LDLC SERPL CALC-MCNC: 123 MG/DL
LYMPHOCYTES # BLD AUTO: 1.63 K/UL
LYMPHOCYTES NFR BLD AUTO: 28.7 %
MAN DIFF?: NORMAL
MCHC RBC-ENTMCNC: 30.4 PG
MCHC RBC-ENTMCNC: 31.9 GM/DL
MCV RBC AUTO: 95.2 FL
MONOCYTES # BLD AUTO: 0.58 K/UL
MONOCYTES NFR BLD AUTO: 10.2 %
NEUTROPHILS # BLD AUTO: 3.32 K/UL
NEUTROPHILS NFR BLD AUTO: 58.4 %
PLATELET # BLD AUTO: 261 K/UL
POTASSIUM SERPL-SCNC: 4.4 MMOL/L
PROT SERPL-MCNC: 7 G/DL
PSA SERPL-MCNC: 0.59 NG/ML
RBC # BLD: 5.03 M/UL
RBC # FLD: 13.1 %
SODIUM SERPL-SCNC: 143 MMOL/L
TRIGL SERPL-MCNC: 79 MG/DL
TSH SERPL-ACNC: 1.4 UIU/ML
VIT B12 SERPL-MCNC: 1734 PG/ML
WBC # FLD AUTO: 5.68 K/UL

## 2020-03-16 ENCOUNTER — TRANSCRIPTION ENCOUNTER (OUTPATIENT)
Age: 65
End: 2020-03-16

## 2020-03-25 PROBLEM — M54.5 CHRONIC BILATERAL LOW BACK PAIN WITHOUT SCIATICA: Status: ACTIVE | Noted: 2020-03-10

## 2020-03-25 PROBLEM — H00.019 STYE: Status: ACTIVE | Noted: 2020-03-10

## 2020-03-25 NOTE — HEALTH RISK ASSESSMENT
[Yes] : Yes [1] : 2) Feeling down, depressed, or hopeless for several days (1) [] : No [[id number]] : 2

## 2020-03-25 NOTE — REVIEW OF SYSTEMS
[Unsteady Walk] : ataxia [Negative] : Heme/Lymph [Dizziness] : no dizziness [Fainting] : no fainting [Confusion] : no confusion [Memory Loss] : no memory loss [Suicidal] : not suicidal [Insomnia] : no insomnia [Anxiety] : no anxiety [Depression] : no depression [Easy Bleeding] : no easy bleeding [Easy Bruising] : no easy bruising [Swollen Glands] : no swollen glands [de-identified] : snores lightly

## 2020-03-25 NOTE — PHYSICAL EXAM
[No Acute Distress] : no acute distress [Well Nourished] : well nourished [Well Developed] : well developed [Well-Appearing] : well-appearing [Normal Sclera/Conjunctiva] : normal sclera/conjunctiva [PERRL] : pupils equal round and reactive to light [EOMI] : extraocular movements intact [Normal Outer Ear/Nose] : the outer ears and nose were normal in appearance [Normal Oropharynx] : the oropharynx was normal [No JVD] : no jugular venous distention [No Lymphadenopathy] : no lymphadenopathy [Supple] : supple [Thyroid Normal, No Nodules] : the thyroid was normal and there were no nodules present [No Respiratory Distress] : no respiratory distress  [No Accessory Muscle Use] : no accessory muscle use [Clear to Auscultation] : lungs were clear to auscultation bilaterally [Normal Rate] : normal rate  [Regular Rhythm] : with a regular rhythm [Normal S1, S2] : normal S1 and S2 [No Murmur] : no murmur heard [No Carotid Bruits] : no carotid bruits [No Abdominal Bruit] : a ~M bruit was not heard ~T in the abdomen [No Varicosities] : no varicosities [Pedal Pulses Present] : the pedal pulses are present [No Edema] : there was no peripheral edema [No Palpable Aorta] : no palpable aorta [No Extremity Clubbing/Cyanosis] : no extremity clubbing/cyanosis [Soft] : abdomen soft [Non Tender] : non-tender [Non-distended] : non-distended [No Masses] : no abdominal mass palpated [No HSM] : no HSM [Normal Bowel Sounds] : normal bowel sounds [Normal Posterior Cervical Nodes] : no posterior cervical lymphadenopathy [Normal Anterior Cervical Nodes] : no anterior cervical lymphadenopathy [No CVA Tenderness] : no CVA  tenderness [No Spinal Tenderness] : no spinal tenderness [No Joint Swelling] : no joint swelling [No Rash] : no rash [No Focal Deficits] : no focal deficits [Normal Affect] : the affect was normal [Normal Insight/Judgement] : insight and judgment were intact [Normal] : affect was normal and insight and judgment were intact [de-identified] : lower left inner eyelid with small 1 mm bump on outer lid with increase injection of lower lid. [de-identified] : mild cobblestoning, dry open nasal turbinates with scant secretion [de-identified] : Weakness in RLE>LLE

## 2020-03-25 NOTE — HISTORY OF PRESENT ILLNESS
[de-identified] : Here with wife.\par 64 yo white male, retired  at [employer] with ALS, with symptoms beginning 2018, now with progressive weakness in both legs, here for follow-up of HTN, obesity, and fasting labs. (BMI 32).  \par Needs Prevnar vaccine.\par Notes some drainage from left eye <week ago, with a stye in left lower lid, no redness or tearing \par \par ALS-seeing neuro Dr Taveras since Dec 2019.\par Tolerating Riluzole and began daily IV infusion Radiacava this weekend. Ambulating slowly limited distances with walker, right leg much weaker than left leg. 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

## 2020-05-18 ENCOUNTER — TRANSCRIPTION ENCOUNTER (OUTPATIENT)
Age: 65
End: 2020-05-18

## 2020-06-05 ENCOUNTER — TRANSCRIPTION ENCOUNTER (OUTPATIENT)
Age: 65
End: 2020-06-05

## 2020-06-09 ENCOUNTER — APPOINTMENT (OUTPATIENT)
Dept: INTERNAL MEDICINE | Facility: CLINIC | Age: 65
End: 2020-06-09
Payer: MEDICARE

## 2020-06-09 VITALS
HEIGHT: 73 IN | HEART RATE: 79 BPM | DIASTOLIC BLOOD PRESSURE: 76 MMHG | WEIGHT: 256 LBS | SYSTOLIC BLOOD PRESSURE: 120 MMHG | OXYGEN SATURATION: 96 % | BODY MASS INDEX: 33.93 KG/M2

## 2020-06-09 PROCEDURE — 99213 OFFICE O/P EST LOW 20 MIN: CPT

## 2020-06-15 NOTE — PHYSICAL EXAM
[Normal] : no posterior cervical lymphadenopathy and no anterior cervical lymphadenopathy [de-identified] : weakness in BLE and RUE noted

## 2020-06-15 NOTE — HISTORY OF PRESENT ILLNESS
[de-identified] : Here with wife.\par 66 yo white male, retired  at [employer] with ALS, here for f/u of HTN/lipids/weight\par \par ALS-seeing neuro Dr Taveras since Dec 2019.\par Tolerating Riluzole and began daily IV infusion Radiacava this weekend. Ambulating slowly limited distances with walker, right leg much weaker than left leg. 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

## 2020-06-16 LAB
ALBUMIN SERPL ELPH-MCNC: 4.3 G/DL
ALP BLD-CCNC: 56 U/L
ALT SERPL-CCNC: 30 U/L
ANION GAP SERPL CALC-SCNC: 14 MMOL/L
AST SERPL-CCNC: 40 U/L
BASOPHILS # BLD AUTO: 0.05 K/UL
BASOPHILS NFR BLD AUTO: 0.9 %
BILIRUB SERPL-MCNC: 0.6 MG/DL
BUN SERPL-MCNC: 13 MG/DL
CALCIUM SERPL-MCNC: 9.4 MG/DL
CHLORIDE SERPL-SCNC: 104 MMOL/L
CHOLEST SERPL-MCNC: 227 MG/DL
CHOLEST/HDLC SERPL: 3.3 RATIO
CO2 SERPL-SCNC: 23 MMOL/L
CREAT SERPL-MCNC: 0.86 MG/DL
EOSINOPHIL # BLD AUTO: 0.08 K/UL
EOSINOPHIL NFR BLD AUTO: 1.5 %
ESTIMATED AVERAGE GLUCOSE: 105 MG/DL
GLUCOSE SERPL-MCNC: 89 MG/DL
HBA1C MFR BLD HPLC: 5.3 %
HCT VFR BLD CALC: 46.5 %
HDLC SERPL-MCNC: 69 MG/DL
HGB BLD-MCNC: 15.2 G/DL
IMM GRANULOCYTES NFR BLD AUTO: 0.4 %
LDLC SERPL CALC-MCNC: 144 MG/DL
LYMPHOCYTES # BLD AUTO: 1.46 K/UL
LYMPHOCYTES NFR BLD AUTO: 26.9 %
MAN DIFF?: NORMAL
MCHC RBC-ENTMCNC: 30.7 PG
MCHC RBC-ENTMCNC: 32.7 GM/DL
MCV RBC AUTO: 93.9 FL
MONOCYTES # BLD AUTO: 0.56 K/UL
MONOCYTES NFR BLD AUTO: 10.3 %
NEUTROPHILS # BLD AUTO: 3.25 K/UL
NEUTROPHILS NFR BLD AUTO: 60 %
PLATELET # BLD AUTO: 257 K/UL
POTASSIUM SERPL-SCNC: 4.2 MMOL/L
PROT SERPL-MCNC: 6.8 G/DL
RBC # BLD: 4.95 M/UL
RBC # FLD: 13.2 %
SARS-COV-2 IGG SERPL IA-ACNC: 0.01 INDEX
SARS-COV-2 IGG SERPL QL IA: NEGATIVE
SODIUM SERPL-SCNC: 140 MMOL/L
TRIGL SERPL-MCNC: 75 MG/DL
WBC # FLD AUTO: 5.42 K/UL

## 2020-06-29 LAB — HEMOCCULT STL QL IA: NEGATIVE

## 2020-06-30 ENCOUNTER — APPOINTMENT (OUTPATIENT)
Dept: INTERNAL MEDICINE | Facility: CLINIC | Age: 65
End: 2020-06-30
Payer: MEDICARE

## 2020-06-30 ENCOUNTER — APPOINTMENT (OUTPATIENT)
Dept: NEUROLOGY | Facility: CLINIC | Age: 65
End: 2020-06-30
Payer: MEDICARE

## 2020-06-30 DIAGNOSIS — R29.898 OTHER SYMPTOMS AND SIGNS INVOLVING THE MUSCULOSKELETAL SYSTEM: ICD-10-CM

## 2020-06-30 DIAGNOSIS — R26.9 UNSPECIFIED ABNORMALITIES OF GAIT AND MOBILITY: ICD-10-CM

## 2020-06-30 DIAGNOSIS — M79.89 OTHER SPECIFIED SOFT TISSUE DISORDERS: ICD-10-CM

## 2020-06-30 PROCEDURE — 99213 OFFICE O/P EST LOW 20 MIN: CPT | Mod: 95

## 2020-06-30 PROCEDURE — 99214 OFFICE O/P EST MOD 30 MIN: CPT | Mod: 95

## 2020-06-30 RX ORDER — BACLOFEN 10 MG/1
10 TABLET ORAL
Refills: 0 | Status: DISCONTINUED | COMMUNITY
End: 2020-06-30

## 2020-06-30 NOTE — PHYSICAL EXAM
[Cranial Nerves Facial (VII)] : face symmetrical [Cranial Nerves Oculomotor (III)] : extraocular motion intact [Cranial Nerves Accessory (XI - Cranial And Spinal)] : head turning and shoulder shrug symmetric [FreeTextEntry6] : gross motor exam unchanged from last visit.  ALS-FRS 41/48 [FreeTextEntry5] : scant tongue fascics, slow tongue movements

## 2020-06-30 NOTE — ASSESSMENT
[FreeTextEntry1] : Patient stable with only one point drop in ALS-FRS. \par \par Cont riluzole and radicava, f/u ALS clinic in Sept.  \par \par Will consider doppler LE's to r/o DVT, will have home PT started and have home evaluation for services such as shower chair.

## 2020-06-30 NOTE — HISTORY OF PRESENT ILLNESS
[Medical Office: ([facility])___] : at the medical office located in  [Home] : at home, [unfilled] , at the time of the visit. [Spouse] : spouse [Verbal consent obtained from patient] : the patient, [unfilled] [FreeTextEntry1] : Patient states that his right hand is getting weaker with loss of dexterity, left hand and arm are ok.  He has been dragging his right leg and very weak - and stiff.  He uses a walker still.  He lives with his wife and is independent.  He states it is becoming difficult to  the shower and feels he needs a shower.  \par \par He stopped PT a few months ago due to COVID.  He is taking both riluzole and radicava and tolerating well. He gets HA after first day of radicava.  \par \par He gets cramps in feet at times.  No pooling of saliva, denies coughing on food and liquids.  He feeds himself.  Handwriting is getting worse.  Gets SOB with exertion.\par \par Saw Dr. Gonzales and told her about right more than left leg swelling

## 2020-06-30 NOTE — REVIEW OF SYSTEMS
[Joint Stiffness] : joint stiffness [Recent Change In Weight] : ~T recent weight change [Joint Pain] : joint pain [Joint Swelling] : joint swelling [Negative] : Gastrointestinal [Back Pain] : no back pain

## 2020-06-30 NOTE — COUNSELING
[Potential consequences of obesity discussed] : Potential consequences of obesity discussed [Structured Weight Management Program suggested:] : Structured weight management program suggested [Benefits of weight loss discussed] : Benefits of weight loss discussed [Encouraged to maintain food diary] : Encouraged to maintain food diary [FreeTextEntry2] : Restrict carbs and excessive fax.

## 2020-06-30 NOTE — HISTORY OF PRESENT ILLNESS
[Home] : at home, [unfilled] , at the time of the visit. [Other Location: e.g. Home (Enter Location, City,State)___] : at [unfilled] [Verbal consent obtained from patient] : the patient, [unfilled] [Spouse] : spouse [de-identified] : Face to Face medicare encounter.\par Referred for telehealth visit after his specialist DR Taveras noted chronic swelling of his legs R>L and wanted to r/o DVT. He is at higher risk for blood clots, as more sedentary with his diagnosis of ALS and progressive weakness in his extremities and fine motor movements in his right hand. He has almost no movement in right leg and has some in left leg. He wears supports for his legs to control edema, which is more when he sits all day. Does not add any salt to his food.. He is still managing with difficulty to walk with walker. Unable to stand long and having difficulty showering. He is able to tolerate solid foods and liquids. Speech is intact.  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 He was getting PT TIW but stopped with covid crisis. \par Has some mild constipation weekly, responsive to Miralax. Urine flow is stable.\par He lives with his wife who is very supportive of him.\par He is tolerating Riluzole and IV infusion Radiacava.\par

## 2020-06-30 NOTE — PHYSICAL EXAM
[No Acute Distress] : no acute distress [Memory Grossly Normal] : memory grossly normal [Speech Grossly Normal] : speech grossly normal [Normal Affect] : the affect was normal [de-identified] : movng freely both arms and hands, unable to move or lift his right leg, left leg still moves but weak [de-identified] : wearing spports but can tell swelling up  on right leg

## 2020-07-08 ENCOUNTER — OUTPATIENT (OUTPATIENT)
Dept: OUTPATIENT SERVICES | Facility: HOSPITAL | Age: 65
LOS: 1 days | End: 2020-07-08
Payer: MEDICARE

## 2020-07-08 ENCOUNTER — APPOINTMENT (OUTPATIENT)
Dept: ULTRASOUND IMAGING | Facility: CLINIC | Age: 65
End: 2020-07-08
Payer: MEDICARE

## 2020-07-08 ENCOUNTER — RESULT REVIEW (OUTPATIENT)
Age: 65
End: 2020-07-08

## 2020-07-08 DIAGNOSIS — M79.89 OTHER SPECIFIED SOFT TISSUE DISORDERS: ICD-10-CM

## 2020-07-08 DIAGNOSIS — R29.898 OTHER SYMPTOMS AND SIGNS INVOLVING THE MUSCULOSKELETAL SYSTEM: ICD-10-CM

## 2020-07-08 DIAGNOSIS — G12.21 AMYOTROPHIC LATERAL SCLEROSIS: ICD-10-CM

## 2020-07-08 PROCEDURE — 93970 EXTREMITY STUDY: CPT

## 2020-07-08 PROCEDURE — 93970 EXTREMITY STUDY: CPT | Mod: 26

## 2020-09-01 ENCOUNTER — APPOINTMENT (OUTPATIENT)
Dept: NEUROLOGY | Facility: CLINIC | Age: 65
End: 2020-09-01
Payer: MEDICARE

## 2020-09-01 VITALS
WEIGHT: 260 LBS | DIASTOLIC BLOOD PRESSURE: 85 MMHG | TEMPERATURE: 97 F | SYSTOLIC BLOOD PRESSURE: 165 MMHG | BODY MASS INDEX: 34.46 KG/M2 | HEIGHT: 73 IN | HEART RATE: 90 BPM

## 2020-09-01 VITALS — TEMPERATURE: 97.7 F

## 2020-09-01 PROCEDURE — 99214 OFFICE O/P EST MOD 30 MIN: CPT

## 2020-09-01 NOTE — HISTORY OF PRESENT ILLNESS
[FreeTextEntry1] : Patient states his right arm is weaker and right leg is non-functional and he drags it when walking.  Using rollator.  Fell once since last telehealth visit.  \par \par Left leg is a bit stiffer, gets cramps in calves - right hand weaker also and still can write but handwriting is worse.  \par \par Left arm is good.  No SOB at rest, only with exertion.  Can not ambulate more than a few steps.  No slurring of speech, no coughing on liquids.  Saliva normal, no drooling.  Tolerating riluzole and radicava.

## 2020-09-01 NOTE — ASSESSMENT
[FreeTextEntry1] : Patient has an UMN variant of ALS, is starting to progress with LMN signs (DTR's dropping a bit).  Bulbar function still very good.  Prognosis is good for slow progression.  \par \par Will continue riluzole and radicava.  \par \par f/u 4 months

## 2020-09-01 NOTE — PHYSICAL EXAM
[Person] : oriented to person [Place] : oriented to place [Time] : oriented to time [Short Term Intact] : short term memory intact [Remote Intact] : remote memory intact [Registration Intact] : recent registration memory intact [Concentration Intact] : normal concentrating ability [Visual Intact] : visual attention was ~T not ~L decreased [Naming Objects] : no difficulty naming common objects [Repeating Phrases] : no difficulty repeating a phrase [Writing A Sentence] : no difficulty writing a sentence [Fluency] : fluency intact [Comprehension] : comprehension intact [Reading] : reading intact [Past History] : adequate knowledge of personal past history [Cranial Nerves Optic (II)] : visual acuity intact bilaterally,  visual fields full to confrontation, pupils equal round and reactive to light [Cranial Nerves Oculomotor (III)] : extraocular motion intact [Cranial Nerves Trigeminal (V)] : facial sensation intact symmetrically [Cranial Nerves Facial (VII)] : face symmetrical [Cranial Nerves Vestibulocochlear (VIII)] : hearing was intact bilaterally [Cranial Nerves Glossopharyngeal (IX)] : tongue and palate midline [Cranial Nerves Accessory (XI - Cranial And Spinal)] : head turning and shoulder shrug symmetric [Motor Handedness Right-Handed] : the patient is right hand dominant [4] : shoulder abduction 4/5 [Hand Weakness Right] : the hand  was weak [Hand Weakness Left] : normal hand  [-4] : knee extension -4/5 [3] : ankle inversion 3/5 [+4] : ankle eversion +4/5 [5] : ankle inversion 5/5 [Sensation Tactile Decrease] : light touch was intact [Proprioception] : proprioception was intact [Romberg's Sign] : Romberg's sign was negtive [Vibration Decrease Leg / Foot Right Toes] : decreased at the toes of the right foot [Vibration Decrease Leg / Foot Left Toes] : normal  at the toes of the left foot [Balance] : balance was intact [Past-pointing] : there was no past-pointing [Tremor] : no tremor present [3+] : Patella left 3+ [2+] : Ankle jerk right 2+ [0] : Ankle jerk left 0 [Plantar Reflex Right Only] : abnormal on the right [Plantar Reflex Left Only] : abnormal on the left [___] : absent on the right [___] : absent on the left [FreeTextEntry5] : slow tongue movements with scant tongue fascics noted [FreeTextEntry6] : atrophy right more than left post deltoids, fascics noted back, right proximal arm and calves.  Tone MAS RLE 3/4, LLE 2/4.  ALS-FRS 38/48. [FreeTextEntry8] : spastic gait [FreeTextEntry9] : bilateral pectorals, right with spread

## 2020-09-14 ENCOUNTER — APPOINTMENT (OUTPATIENT)
Dept: NEUROLOGY | Facility: CLINIC | Age: 65
End: 2020-09-14
Payer: MEDICARE

## 2020-09-14 VITALS
HEIGHT: 73 IN | SYSTOLIC BLOOD PRESSURE: 126 MMHG | WEIGHT: 260 LBS | DIASTOLIC BLOOD PRESSURE: 83 MMHG | BODY MASS INDEX: 34.46 KG/M2 | HEART RATE: 73 BPM

## 2020-09-14 PROCEDURE — 99215 OFFICE O/P EST HI 40 MIN: CPT | Mod: 25

## 2020-09-14 PROCEDURE — 97162 PT EVAL MOD COMPLEX 30 MIN: CPT | Mod: GP

## 2020-09-14 PROCEDURE — 97166 OT EVAL MOD COMPLEX 45 MIN: CPT

## 2020-09-14 PROCEDURE — 92610 EVALUATE SWALLOWING FUNCTION: CPT

## 2020-09-14 NOTE — ASSESSMENT
[FreeTextEntry1] : MATILDE CODY evaluated by me today  in multidisciplinary ALS clinic; requires evaluations today by PT/OT/speech and swallow therapists.  Social work needs addressed and goals of care reinforced. End of life care including health care proxy and patient wishes discussed.  He is still working from home remotely.  \par \par Nutrition/dietary needs discussed and addressed by MD.   \par \par Pulmonary function assessed:  O2 sat 96, ET CO2 36, HR 77, NIF -60; gets BOGGS but can clear throat\par \par Saliva management needs assessed - none. \par \par PT/OT recommends:  Right shoulder pain from weakness.  Cont home PT, not using AFO, wears YIFAN hose.  Has lost ability to ambulate safely - last fall 6 weaks ago. \par \par Speech/Swallow therapy recommends: Functional, reg diet.  \par \par Continue riluzole 50m BID\par \par Continue edaravone.  \par \par Patient is being seen today for the face-to-face visit for evaluation for a powered mobility device (PMD).  Patient has a diagnosis of ALS/motor neuron disease.  This disease is incurable.  The weakness currently seen in limb, trunk, neck and respiratory muscles is expected to increase as the disease progresses.  Considering the physical exam listed above, patient is unable to functionally ambulate safely in their home.  Furthermore, the strength in his/her arms is insufficient to bear the amount of weight required for walking with any type of assistive device.  Patient has a recent history of falling while using a walker due to insufficient arm and leg strength.  They are unable to propel even a properly configured ultra lightweight manual wheelchair due to upper extremity weakness, imbalance, decreased range of motion, paralysis, and cardiac/respiratory compromise.  In addition, they cannot use a Scooter safely in their home due to postural deformity, need for positioning, and inability to use a tiller secondary to decreased arms strength.  A power wheelchair is the only option for safe and independent mobility in their residence.  This client is cognitively and physically able to operate a power wheelchair throughout their home and is willing to utilize it in their home.  The patient’s vision is also within functional limitations for safe driving.\par \par f/u in 3 months at ALS clinic\par \par

## 2020-09-14 NOTE — HISTORY OF PRESENT ILLNESS
[FreeTextEntry1] : Patient unchanged from visit 2 weeks ago, here for ALS clinic evals including for WC as he can no longer safely ambulate.

## 2020-09-14 NOTE — PHYSICAL EXAM
[Place] : oriented to place [Person] : oriented to person [Short Term Intact] : short term memory intact [Remote Intact] : remote memory intact [Time] : oriented to time [Registration Intact] : recent registration memory intact [Concentration Intact] : normal concentrating ability [Visual Intact] : visual attention was ~T not ~L decreased [Naming Objects] : no difficulty naming common objects [Repeating Phrases] : no difficulty repeating a phrase [Writing A Sentence] : no difficulty writing a sentence [Comprehension] : comprehension intact [Fluency] : fluency intact [Reading] : reading intact [Past History] : adequate knowledge of personal past history [Cranial Nerves Optic (II)] : visual acuity intact bilaterally,  visual fields full to confrontation, pupils equal round and reactive to light [Cranial Nerves Oculomotor (III)] : extraocular motion intact [Cranial Nerves Trigeminal (V)] : facial sensation intact symmetrically [Cranial Nerves Facial (VII)] : face symmetrical [Cranial Nerves Vestibulocochlear (VIII)] : hearing was intact bilaterally [Cranial Nerves Accessory (XI - Cranial And Spinal)] : head turning and shoulder shrug symmetric [Cranial Nerves Glossopharyngeal (IX)] : tongue and palate midline [Motor Handedness Right-Handed] : the patient is right hand dominant [4] : shoulder abduction 4/5 [Hand Weakness Right] : the hand  was weak [-4] : knee extension -4/5 [3] : ankle inversion 3/5 [+4] : ankle eversion +4/5 [5] : ankle inversion 5/5 [Sensation Tactile Decrease] : light touch was intact [Proprioception] : proprioception was intact [Vibration Decrease Leg / Foot Right Toes] : decreased at the toes of the right foot [Balance] : balance was intact [3+] : Patella left 3+ [2+] : Ankle jerk right 2+ [0] : Ankle jerk left 0 [Plantar Reflex Right Only] : abnormal on the right [Plantar Reflex Left Only] : abnormal on the left [Vibration Decrease Leg / Foot Left Toes] : normal  at the toes of the left foot [Hand Weakness Left] : normal hand  [Romberg's Sign] : Romberg's sign was negtive [Past-pointing] : there was no past-pointing [Tremor] : no tremor present [___] : absent on the right [___] : absent on the left [FreeTextEntry5] : slow tongue movements with scant tongue fascics noted [FreeTextEntry6] : atrophy right more than left post deltoids, fascics noted back, right proximal arm and calves.  Tone MAS RLE 3/4, LLE 2/4.  ALS-FRS 39/48 (+1) [FreeTextEntry9] : bilateral pectorals, right with spread [FreeTextEntry8] : spastic gait

## 2020-09-15 ENCOUNTER — APPOINTMENT (OUTPATIENT)
Dept: INTERNAL MEDICINE | Facility: CLINIC | Age: 65
End: 2020-09-15
Payer: MEDICARE

## 2020-09-15 VITALS
BODY MASS INDEX: 33.13 KG/M2 | WEIGHT: 250 LBS | DIASTOLIC BLOOD PRESSURE: 70 MMHG | HEART RATE: 64 BPM | HEIGHT: 73 IN | SYSTOLIC BLOOD PRESSURE: 128 MMHG | OXYGEN SATURATION: 96 %

## 2020-09-15 PROCEDURE — G0008: CPT

## 2020-09-15 PROCEDURE — 99214 OFFICE O/P EST MOD 30 MIN: CPT | Mod: 25

## 2020-09-15 PROCEDURE — 90662 IIV NO PRSV INCREASED AG IM: CPT

## 2020-09-20 NOTE — HISTORY OF PRESENT ILLNESS
[de-identified] : Hx of ALS with weakness in legs, R>L and now with some weakness in right upper extremity.\par Wears supports to control edema n legs\par Walks with walker and uses wheelchair for longer distances\par Follows closely with DR Aviles, on meds.\par \par Hx HTN/obesity-here for BP and weight check and labs.\par BP controlled\par Weight

## 2020-10-22 ENCOUNTER — TRANSCRIPTION ENCOUNTER (OUTPATIENT)
Age: 65
End: 2020-10-22

## 2020-10-22 LAB
ALBUMIN SERPL ELPH-MCNC: 4.3 G/DL
ALP BLD-CCNC: 61 U/L
ALT SERPL-CCNC: 29 U/L
ANION GAP SERPL CALC-SCNC: 10 MMOL/L
AST SERPL-CCNC: 34 U/L
BASOPHILS # BLD AUTO: 0.07 K/UL
BASOPHILS NFR BLD AUTO: 1.2 %
BILIRUB SERPL-MCNC: 0.6 MG/DL
BUN SERPL-MCNC: 15 MG/DL
CALCIUM SERPL-MCNC: 9.5 MG/DL
CHLORIDE SERPL-SCNC: 105 MMOL/L
CHOLEST SERPL-MCNC: 223 MG/DL
CHOLEST/HDLC SERPL: 3.8 RATIO
CO2 SERPL-SCNC: 26 MMOL/L
CREAT SERPL-MCNC: 0.82 MG/DL
EOSINOPHIL # BLD AUTO: 0.09 K/UL
EOSINOPHIL NFR BLD AUTO: 1.5 %
GLUCOSE SERPL-MCNC: 86 MG/DL
HCT VFR BLD CALC: 45.8 %
HDLC SERPL-MCNC: 59 MG/DL
HGB BLD-MCNC: 14.9 G/DL
IMM GRANULOCYTES NFR BLD AUTO: 0.2 %
LDLC SERPL CALC-MCNC: 148 MG/DL
LYMPHOCYTES # BLD AUTO: 1.58 K/UL
LYMPHOCYTES NFR BLD AUTO: 26.9 %
MAN DIFF?: NORMAL
MCHC RBC-ENTMCNC: 30.2 PG
MCHC RBC-ENTMCNC: 32.5 GM/DL
MCV RBC AUTO: 92.9 FL
MONOCYTES # BLD AUTO: 0.63 K/UL
MONOCYTES NFR BLD AUTO: 10.7 %
NEUTROPHILS # BLD AUTO: 3.5 K/UL
NEUTROPHILS NFR BLD AUTO: 59.5 %
PLATELET # BLD AUTO: 241 K/UL
POTASSIUM SERPL-SCNC: 4.3 MMOL/L
PROT SERPL-MCNC: 6.8 G/DL
RBC # BLD: 4.93 M/UL
RBC # FLD: 13.6 %
SODIUM SERPL-SCNC: 141 MMOL/L
TRIGL SERPL-MCNC: 80 MG/DL
WBC # FLD AUTO: 5.88 K/UL

## 2020-10-24 ENCOUNTER — TRANSCRIPTION ENCOUNTER (OUTPATIENT)
Age: 65
End: 2020-10-24

## 2021-01-11 ENCOUNTER — APPOINTMENT (OUTPATIENT)
Dept: NEUROLOGY | Facility: CLINIC | Age: 66
End: 2021-01-11
Payer: MEDICARE

## 2021-01-11 VITALS
HEIGHT: 73 IN | HEART RATE: 85 BPM | BODY MASS INDEX: 34.46 KG/M2 | WEIGHT: 260 LBS | DIASTOLIC BLOOD PRESSURE: 89 MMHG | SYSTOLIC BLOOD PRESSURE: 146 MMHG

## 2021-01-11 PROCEDURE — 92610 EVALUATE SWALLOWING FUNCTION: CPT

## 2021-01-11 PROCEDURE — 97162 PT EVAL MOD COMPLEX 30 MIN: CPT | Mod: GP,GO

## 2021-01-11 PROCEDURE — 97166 OT EVAL MOD COMPLEX 45 MIN: CPT | Mod: GO,GP

## 2021-01-11 PROCEDURE — 99215 OFFICE O/P EST HI 40 MIN: CPT | Mod: 25

## 2021-01-11 NOTE — HISTORY OF PRESENT ILLNESS
[FreeTextEntry1] : Patient here for follow up in ALS clinic\par \par He reports since last seen, he notes his right arm is weaker and more stiff. right leg unusable. He can stand using bars, but unable to take steps.\par \par He continues on radicava 30mg with not adverse effects.  last recived 2 weeks ago. wife feels he has been steadily declining since he started medication in march.  Riluzole 50mg is also taken with no adverse effects \par \par He reports his speech is clear, but can feel tired toward end of day. His breathing is stable. sleeps on one  pillow. 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 \par \par He is able to cough and has no excessive with saliva.  No difficulty with swallowing,  drinks normal liquids\par eating is not problematic,. cuts his own food. Taking longer to get him self dressed.\par He can roll him self side to side with use of bar and wife helping him.\par \par Hand writing is getting harder, but still legible.\par \par He is able to stand with assistance, but does not ambulate. \par \par Should he need a feeding tube, he is okay with it,but does not want a ventilator. \par \par

## 2021-01-11 NOTE — PHYSICAL EXAM
[General Appearance - Alert] : alert [Affect] : the affect was normal [Mood] : the mood was normal [Person] : oriented to person [Place] : oriented to place [Time] : oriented to time [Short Term Intact] : short term memory intact [Remote Intact] : remote memory intact [Registration Intact] : recent registration memory intact [Span Intact] : the attention span was normal [Concentration Intact] : normal concentrating ability [Visual Intact] : visual attention was ~T not ~L decreased [Naming Objects] : no difficulty naming common objects [Repeating Phrases] : no difficulty repeating a phrase [Writing A Sentence] : no difficulty writing a sentence [Fluency] : fluency intact [Comprehension] : comprehension intact [Reading] : reading intact [Past History] : adequate knowledge of personal past history [Cranial Nerves Optic (II)] : visual acuity intact bilaterally,  visual fields full to confrontation, pupils equal round and reactive to light [Cranial Nerves Oculomotor (III)] : extraocular motion intact [Cranial Nerves Trigeminal (V)] : facial sensation intact symmetrically [Cranial Nerves Vestibulocochlear (VIII)] : hearing was intact bilaterally [Cranial Nerves Facial (VII)] : face symmetrical [Cranial Nerves Glossopharyngeal (IX)] : tongue and palate midline [Cranial Nerves Accessory (XI - Cranial And Spinal)] : head turning and shoulder shrug symmetric [Motor Handedness Right-Handed] : the patient is right hand dominant [Hand Weakness Right] : the hand  was weak [2] : hip flexion 2/5 [4] : hip extension 4/5 [3] : ankle inversion 3/5 [+4] : ankle eversion +4/5 [5] : ankle inversion 5/5 [Sensation Tactile Decrease] : light touch was intact [Proprioception] : proprioception was intact [Vibration Decrease Leg / Foot Right Toes] : decreased at the toes of the right foot [Balance] : balance was intact [3+] : Patella left 3+ [2+] : Ankle jerk right 2+ [0] : Ankle jerk left 0 [Plantar Reflex Right Only] : abnormal on the right [Plantar Reflex Left Only] : abnormal on the left [Hand Weakness Left] : normal hand  [Romberg's Sign] : Romberg's sign was negtive [Vibration Decrease Leg / Foot Left Toes] : normal  at the toes of the left foot [Past-pointing] : there was no past-pointing [Tremor] : no tremor present [___] : absent on the right [___] : absent on the left [FreeTextEntry5] : nml tongue movements with scant tongue fascics noted [FreeTextEntry6] : atrophy right more than left post deltoids, fascics noted back, right proximal arm and calves.  Tone MAS RLE 3/4, LLE 2/4.  ALS-FRS 39/48 (+1) [FreeTextEntry8] : spastic gait-  [FreeTextEntry9] : bilateral pectorals, right with spread

## 2021-01-11 NOTE — ASSESSMENT
[FreeTextEntry1] : MATILDE ESCOBAR evaluated by me today  in multidisciplinary ALS clinic; requires evaluations today by PT/OT/speech and swallow therapists.  Social work needs addressed and goals of care reinforced. End of life care including health care proxy and patient wishes discussed.\par \par Nutrition/dietary needs discussed and addressed, eating well.   \par \par Pulmonary function assessed by respiratory therapy and spirometry.  Patient’s respiratory function is Os sat 95, ET CO2 39, HR 89, RR 16 NIF -60, cough flow 400; no recs\par \par Saliva management needs assessed, no recs\par \par PT/OT recommends: using power chair, using private PT services\par \par Speech/Swallow therapy recommends: no concerns. \par \par Continue riluzole 50m BID, increase baclofen to 30mg TID.  \par \par GOC discussion: he and wife continue to state he does not want invasive ventilation.  \par \par f/u with me in 4 months\par \par f/u in 3 months at ALS clinic\par \par

## 2021-02-08 ENCOUNTER — APPOINTMENT (OUTPATIENT)
Dept: INTERNAL MEDICINE | Facility: CLINIC | Age: 66
End: 2021-02-08
Payer: MEDICARE

## 2021-02-08 PROCEDURE — 99213 OFFICE O/P EST LOW 20 MIN: CPT | Mod: 95

## 2021-02-09 ENCOUNTER — TRANSCRIPTION ENCOUNTER (OUTPATIENT)
Age: 66
End: 2021-02-09

## 2021-04-03 NOTE — HISTORY OF PRESENT ILLNESS
[Home] : at home, [unfilled] , at the time of the visit. [Medical Office: ([facility])___] : at the medical office located in  [Verbal consent obtained from patient] : the patient, [unfilled] [de-identified] : Hx of ALS with weakness in legs, R>L and now with some weakness in right upper extremity.\par Wears supports to control edema in legs\par Using motorized wheelchair\par Moving B/B\par Follows closely with DR Aviles, on meds.\par \par \par Hx HTN/obesity-here for BP and weight check and labs.\par BP controlled\par Weight

## 2021-04-08 ENCOUNTER — APPOINTMENT (OUTPATIENT)
Dept: INTERNAL MEDICINE | Facility: CLINIC | Age: 66
End: 2021-04-08
Payer: MEDICARE

## 2021-04-08 PROCEDURE — 99213 OFFICE O/P EST LOW 20 MIN: CPT | Mod: 95

## 2021-04-09 ENCOUNTER — TRANSCRIPTION ENCOUNTER (OUTPATIENT)
Age: 66
End: 2021-04-09

## 2021-04-11 NOTE — HISTORY OF PRESENT ILLNESS
[Home] : at home, [unfilled] , at the time of the visit. [Medical Office: ([facility])___] : at the medical office located in  [Spouse] : spouse [Verbal consent obtained from patient] : the patient, [unfilled] [de-identified] : 65 yo male with hx of ALS and  weakness in both legs, R>L and now with some weakness in right upper extremity.\par Wears supports to control edema in legs\par Using motorized wheelchair\par Moving B/B\par Follows closely with neurology  Dr Aviles, on meds listed.\par Reports getting a new cough productive of bland phlegm lasting 2-3 minutes that began day after he received first Pfizer vaccine on 2/27. He received second Pfizer on 3/18. Cough was quite heavy and  deep, worse after talking and very responsive to breathing in warm steam.  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 He never had COViD  infection yet but has, had the two COVID vaccines.  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  He is on Radicava and riluzole as per neurology but no significant improvement.\par He is willing to get labs including Covid PCR testing at home since he has been mostly homebound with the pandemic and requires a wheelchair to get around.\par \par Received first  Pfizer COVID vaccines in 2/28 with fever to 100 x 5 days, cough slowly got better but then worsened after he received the 2nd Pfizer 3/18. Cough raspy but unable to bring up phlegm. Wife reports he has trouble breathing through his sinuses when he is sleeping, denies any witnessed apneic moments when sleeping/snoring in certain positions\par \par Hx HTN/obesity-here for BP and weight check and labs.\par BP controlled\par Weight

## 2021-04-11 NOTE — REVIEW OF SYSTEMS
[Fatigue] : fatigue [Dyspnea on Exertion] : dyspnea on exertion [Negative] : Cardiovascular [Fever] : no fever [Night Sweats] : no night sweats [Chest Pain] : no chest pain [Orthopena] : no orthopnea

## 2021-04-13 ENCOUNTER — APPOINTMENT (OUTPATIENT)
Dept: PULMONOLOGY | Facility: CLINIC | Age: 66
End: 2021-04-13
Payer: MEDICARE

## 2021-04-13 VITALS — BODY MASS INDEX: 34.01 KG/M2 | HEIGHT: 74 IN | WEIGHT: 265 LBS

## 2021-04-13 DIAGNOSIS — Z78.9 OTHER SPECIFIED HEALTH STATUS: ICD-10-CM

## 2021-04-13 DIAGNOSIS — Z82.3 FAMILY HISTORY OF STROKE: ICD-10-CM

## 2021-04-13 DIAGNOSIS — Z82.49 FAMILY HISTORY OF ISCHEMIC HEART DISEASE AND OTHER DISEASES OF THE CIRCULATORY SYSTEM: ICD-10-CM

## 2021-04-13 PROCEDURE — 99204 OFFICE O/P NEW MOD 45 MIN: CPT | Mod: 95

## 2021-04-13 NOTE — ASSESSMENT
[FreeTextEntry1] : Mr. CODY is a 66 year old male with a history of ASL, HTN, asthma, OW, arthritis, Kidney stones, Skin CA (melanoma), GERD, Stomach Ulcers who now video calls to the office for an initial pulmonary evaluation. #1 Chronic Cough, #2 SOB\par \par His shortness of breath is multifactorial due to:\par -poor mechanics of breathing \par -out of shape / overweight\par -pulmonary disease\par - Restrictive dysfunction, mild asthma \par -?cardiac disease \par \par His chronic cough is felt to be multifactorial due to:\par -Asthma (Therapy as mentioned) \par -Post nasal drip syndrome/ Allergy \par -GERD / LPR\par \par problem 1: Restrictive Dysfunction (ALS) \par -complete regular PFTs and LAQUITA\par -continue Dr. Taveras Neurologic Evaluations\par \par Problem 2: mild Asthma\par -add Trelegy 200 1 inhalation QD\par Asthma is believed to be caused by inherited (genetic) and environmental factor, but its exact cause is unknown. Asthma may be triggered by allergens, lung infections, or irritants in the air. Asthma triggers are different for each person \par -Inhaler technique reviewed as well as oral hygiene techniques reviewed with patient. Avoidance of cold air, extremes of temperature, rescue inhaler should be used before exercise. Order of medication reviewed with patient. Recommended use of a cool mist humidifier in the bedroom. \par \par Problem 3: Allergies\par -get Blood work to include: asthma panel, food IgE panel, IgE level, eosinophil level, vitamin D level \par -Add Olopatadine 0.6% at 1 sniff/nostril BID \par Environmental measures for allergies were encouraged including mattress and pillow covers, air purifier, and environmental controls.\par \par Problem 3A: Low Vitamin D\par -on Rx\par Has been associated with asthma exacerbations and increased allergic symptoms. The goal based on recent information is maintaining levels between 50-70 and low normal is 30. Recommended 50,000 units every two weeks to once a month depending on the level.\par \par Problem 4: LPR/ Globus/ Reflux\par -Add Pepcid 40 mg QHS \par -Rule of 2s: avoid eating too much, eating too late, eating too spicy, eating two hours before bed.\par -Things to avoid including overeating, spicy foods, tight clothing, eating within three hours of bed, this list is not all inclusive. \par -For treatment of reflux, possible options discussed including diet control, H2 blockers, PPIs, as well as coating motility agents discussed as treatment options. Timing of meals and proximity of last meal to sleep were discussed. If symptoms persist, a formal gastrointestinal evaluation is needed.\par \par Problem 5: ?ARELY\par -complete Home Sleep study\par Sleep apnea is associated with adverse clinical consequences which can affect most organ systems. Cardiovascular disease risk includes arrhythmias, atrial fibrillation, hypertension, coronary artery disease, and stroke. Metabolic disorders include diabetes type 2, non-alcoholic fatty liver disease. Mood disorder especially depression; and cognitive decline especially in the elderly. Associations with chronic reflux/Cortes’s esophagus some but not all inclusive. \par -Reasons include arousal consistent with hypopnea; respiratory events most prominent in REM sleep or supine position; therefore sleep staging and body position are important for accurate diagnosis and estimation of AHI. \par \par Treatment options discussed including CPAP/BiPAP machine, oral appliance, ProVent therapy, Oxy-Aid by [employer], new technologies, or positional sleep.Recommended use of the CPAP machine for moderate (AHI >15), moderate to severe (AHI 15-30) and severe patients (AHI > 30). Recommended weight loss which can reduce AHI especially in weight loss of greater than 5% of BMI. Positional sleep is recommended in those with low AHI, low-moderate BMI, and younger age. For severe sleep apnea, the hypoglossal nerve stimulator was recommended as well. \par \par problem 6: cardiac disease\par -recommended to continue to follow up with Cardiologist \par \par problem 7: poor breathing mechanics\par -Proper breathing techniques were reviewed with an emphasis of exhalation. Patient instructed to breath in for 1 second and out for four seconds. Patient was encouraged to not talk while walking. \par \par problem 8: out of shape / overweight\par -Weight loss, exercise, and diet control were discussed and are highly encouraged. Treatment options were given such as, aqua therapy, and contacting a nutritionist. Recommended to use the elliptical, stationary bike, less use of treadmill. Mindful eating was explained to the patient Obesity is associated with worsening asthma, shortness of breath, and potential for cardiac disease, diabetes, and other underlying medical conditions\par \par problem 9: health maintenance \par -recommended yearly flu shot after October 15\par -recommended strep pneumonia vaccines: Prevnar-13 vaccine, followed by Pneumo vaccine 23 one year following after 65\par -recommended early intervention for Upper Respiratory Infections (URIs)\par -recommended regular osteoporosis evaluations\par -recommended early dermatological evaluations\par -recommended after the age of 50 to the age of 70, colonoscopy every 5 years\par \par F/U in 6-8 weeks.\par He is encouraged to call with any changes, concerns, or questions\par

## 2021-04-13 NOTE — HISTORY OF PRESENT ILLNESS
[Home] : at home, [unfilled] , at the time of the visit. [Medical Office: ([facility])___] : at the medical office located in  [Verbal consent obtained from patient] : the patient, [unfilled] [TextBox_4] : Mr. CODY is a 66 year old male video calling to the office today for initial pulmonary evaluation. His chief complaint is\par \par -he notes history of ALS progressive\par -he notes minimal lower extremity function\par -he notes upper extremities losing strength\par -he notes hip and lower abdominal muscles losing function\par -he notes outer obliques losing function\par -he notes breathing generally stable\par -he notes chronic persistent cough, quiet upon awakening, exacerbated by talking, singing, initially onset 2/26/2021 treated with steam machine, lozenge, mouth wash gargle\par -he denies cough while supine\par -he notes s/p COVID 19 vaccine 2/2021\par -he notes history of intermittent cough triggered by fall seasonal allergies\par -he notes severely congested sinuses\par -he notes intermittent PND exacerbates cough\par -he notes globus sensation\par -he notes intermittent snore\par -he denies waking fatigued\par -he notes his memory and concentration are good \par -he notes SOB exacerbated by singing and progressive ALS\par -he notes neck size 18\par -he denies history of Home sleep study\par -he notes weight progressively increased due to limited physical capabilities\par \par -he denies any chest pain, chest pressure, diarrhea, constipation, dysphagia, dizziness, leg swelling, leg pain, itchy eyes, itchy ears, heartburn, reflux, sour taste in the mouth, myalgias or arthralgias.

## 2021-04-13 NOTE — REASON FOR VISIT
[Initial] : an initial visit [TextBox_44] : video call- restrictive dysfunction (ALS), mild asthma, Allergies, LPR/ Globus, ?ARELY

## 2021-04-13 NOTE — ADDENDUM
[FreeTextEntry1] : Documented by Boone Sherman acting as a scribe for Dr. Chirstiano Pérez on 04/13/2021.\par \par All medical record entries made by the Scribe were at my, Dr. Christiano Pérez's, direction and personally dictated by me on 04/13/2021 . I have reviewed the chart and agree that the record accurately reflects my personal performance of the history, physical exam, assessment and plan. I have also personally directed, reviewed, and agree with the discharge instructions. \par

## 2021-04-23 ENCOUNTER — TRANSCRIPTION ENCOUNTER (OUTPATIENT)
Age: 66
End: 2021-04-23

## 2021-06-14 ENCOUNTER — APPOINTMENT (OUTPATIENT)
Dept: NEUROLOGY | Facility: CLINIC | Age: 66
End: 2021-06-14
Payer: MEDICARE

## 2021-06-14 VITALS — HEIGHT: 74 IN | DIASTOLIC BLOOD PRESSURE: 73 MMHG | HEART RATE: 99 BPM | SYSTOLIC BLOOD PRESSURE: 120 MMHG

## 2021-06-14 PROCEDURE — 92610 EVALUATE SWALLOWING FUNCTION: CPT

## 2021-06-14 PROCEDURE — 97164 PT RE-EVAL EST PLAN CARE: CPT

## 2021-06-14 PROCEDURE — 97168 OT RE-EVAL EST PLAN CARE: CPT

## 2021-06-14 PROCEDURE — 99215 OFFICE O/P EST HI 40 MIN: CPT | Mod: 25

## 2021-06-14 NOTE — PHYSICAL EXAM
[Person] : oriented to person [Place] : oriented to place [Time] : oriented to time [Short Term Intact] : short term memory intact [Remote Intact] : remote memory intact [Registration Intact] : recent registration memory intact [Span Intact] : the attention span was normal [Concentration Intact] : normal concentrating ability [Visual Intact] : visual attention was ~T not ~L decreased [Naming Objects] : no difficulty naming common objects [Repeating Phrases] : no difficulty repeating a phrase [Writing A Sentence] : no difficulty writing a sentence [Fluency] : fluency intact [Comprehension] : comprehension intact [Reading] : reading intact [Past History] : adequate knowledge of personal past history [Cranial Nerves Optic (II)] : visual acuity intact bilaterally,  visual fields full to confrontation, pupils equal round and reactive to light [Cranial Nerves Oculomotor (III)] : extraocular motion intact [Cranial Nerves Trigeminal (V)] : facial sensation intact symmetrically [Cranial Nerves Vestibulocochlear (VIII)] : hearing was intact bilaterally [Cranial Nerves Facial (VII)] : face symmetrical [Cranial Nerves Glossopharyngeal (IX)] : tongue and palate midline [Cranial Nerves Accessory (XI - Cranial And Spinal)] : head turning and shoulder shrug symmetric [Motor Handedness Right-Handed] : the patient is right hand dominant [Hand Weakness Right] : the hand  was weak [4] : hip extension 4/5 [+4] : ankle eversion +4/5 [5] : ankle inversion 5/5 [Sensation Tactile Decrease] : light touch was intact [Proprioception] : proprioception was intact [Vibration Decrease Leg / Foot Right Toes] : decreased at the toes of the right foot [Balance] : balance was intact [3+] : Patella left 3+ [2+] : Ankle jerk right 2+ [0] : Ankle jerk left 0 [Plantar Reflex Right Only] : abnormal on the right [Plantar Reflex Left Only] : abnormal on the left [1] : hip flexion 1/5 [2] : ankle inversion 2/5 [Hand Weakness Left] : normal hand  [Romberg's Sign] : Romberg's sign was negtive [Vibration Decrease Leg / Foot Left Toes] : normal  at the toes of the left foot [Past-pointing] : there was no past-pointing [Tremor] : no tremor present [___] : absent on the right [___] : absent on the left [FreeTextEntry5] : nml tongue movements with scant tongue fascics noted [FreeTextEntry6] : atrophy right more than left post deltoids, fascics noted back, right proximal arm and calves.  Tone MAS RLE 3/4, LLE 2/4.  ALS-FRS 31/48 [FreeTextEntry8] : spastic gait-  [FreeTextEntry9] : bilateral pectorals, right with spread

## 2021-06-14 NOTE — ASSESSMENT
[FreeTextEntry1] : MATILDE ESCOBAR evaluated by me today  in multidisciplinary ALS clinic; requires evaluations today by PT/OT/speech and swallow therapists.  Social work needs addressed and goals of care reinforced. End of life care including health care proxy and patient wishes discussed.\par \par Nutrition/dietary needs discussed and addressed.  \par \par Pulmonary function assessed by respiratory therapy and spirometry.  Patient’s respiratory function is O2 sat 95, ET CO2 35, HR 87, RR 18.  cough flow 530, NIF -60\par \par Saliva management needs assessed.  \par \par PT/OT recommends:  Arm weaker, RUE - impacting ADL's.  Will get Hector lift from [facility].  Patient wants to buy his own hosp bed.  He can assist with transfers.  \par \par Speech/Swallow therapy recommends: functional swallow, will see ENT for inspection of vocal cords\par \par Continue riluzole 50m BID\par \par DC edaravone. \par \par f/u in 3 months with me.  \par \par

## 2021-06-14 NOTE — HISTORY OF PRESENT ILLNESS
[FreeTextEntry1] : Pt here for ALS clinic\par \par He notes right arm weakness continue to decline as well as the ability to stand, now can only for 15 seconds.\par \par Radicava was stopped due to insurance. last received in April. no changes since stopped. He continues on Riluzole 50mg BID  with no adverse effects\par  \par writing is better\par  \par Requires assistance to turn in bed. cannot bring covers up to him\par He states  in February after Pfizer shot had dry cough, worse after 2nd shot.  and has been lingering since.  has some SOB at night, but feels that he can breath better when he opens his mouth. Sleeps on one pillow. 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 Clears his throat frequently. \par He reports no worsening with eating or drinking.\par \par

## 2021-07-02 ENCOUNTER — NON-APPOINTMENT (OUTPATIENT)
Age: 66
End: 2021-07-02

## 2021-07-02 ENCOUNTER — APPOINTMENT (OUTPATIENT)
Dept: PULMONOLOGY | Facility: CLINIC | Age: 66
End: 2021-07-02
Payer: MEDICARE

## 2021-07-02 VITALS
HEIGHT: 74 IN | DIASTOLIC BLOOD PRESSURE: 80 MMHG | BODY MASS INDEX: 35.29 KG/M2 | OXYGEN SATURATION: 97 % | RESPIRATION RATE: 16 BRPM | HEART RATE: 70 BPM | WEIGHT: 275 LBS | SYSTOLIC BLOOD PRESSURE: 136 MMHG | TEMPERATURE: 96.7 F

## 2021-07-02 DIAGNOSIS — R05 COUGH: ICD-10-CM

## 2021-07-02 PROCEDURE — 94010 BREATHING CAPACITY TEST: CPT

## 2021-07-02 PROCEDURE — 99214 OFFICE O/P EST MOD 30 MIN: CPT | Mod: 25

## 2021-07-02 PROCEDURE — 94726 PLETHYSMOGRAPHY LUNG VOLUMES: CPT

## 2021-07-02 PROCEDURE — ZZZZZ: CPT

## 2021-07-02 PROCEDURE — 94729 DIFFUSING CAPACITY: CPT

## 2021-07-02 NOTE — REASON FOR VISIT
[Follow-Up] : a follow-up visit [Sleep Evaluation] : sleep evaluation [Cough] : cough [Shortness of Breath] : shortness of breath [Wheezing] : wheezing [Obesity] : obesity

## 2021-07-07 ENCOUNTER — TRANSCRIPTION ENCOUNTER (OUTPATIENT)
Age: 66
End: 2021-07-07

## 2021-07-13 ENCOUNTER — APPOINTMENT (OUTPATIENT)
Dept: RADIOLOGY | Facility: IMAGING CENTER | Age: 66
End: 2021-07-13
Payer: MEDICARE

## 2021-07-13 ENCOUNTER — OUTPATIENT (OUTPATIENT)
Dept: OUTPATIENT SERVICES | Facility: HOSPITAL | Age: 66
LOS: 1 days | End: 2021-07-13
Payer: MEDICARE

## 2021-07-13 DIAGNOSIS — R05 COUGH: ICD-10-CM

## 2021-07-13 PROCEDURE — 71046 X-RAY EXAM CHEST 2 VIEWS: CPT | Mod: 26

## 2021-07-13 PROCEDURE — 71046 X-RAY EXAM CHEST 2 VIEWS: CPT

## 2021-07-20 ENCOUNTER — TRANSCRIPTION ENCOUNTER (OUTPATIENT)
Age: 66
End: 2021-07-20

## 2021-07-21 ENCOUNTER — TRANSCRIPTION ENCOUNTER (OUTPATIENT)
Age: 66
End: 2021-07-21

## 2021-07-27 ENCOUNTER — APPOINTMENT (OUTPATIENT)
Dept: OTOLARYNGOLOGY | Facility: CLINIC | Age: 66
End: 2021-07-27

## 2021-08-05 ENCOUNTER — NON-APPOINTMENT (OUTPATIENT)
Age: 66
End: 2021-08-05

## 2021-08-06 ENCOUNTER — TRANSCRIPTION ENCOUNTER (OUTPATIENT)
Age: 66
End: 2021-08-06

## 2021-08-20 NOTE — HISTORY OF PRESENT ILLNESS
[TextBox_4] : Mr. CODY is a 66 year old male with a history of ALS, HTN, asthma, OW, arthritis, Kidney stones, Skin CA (melanoma), GERD,  A.Fib s/p ablation, Stomach Ulcers who presents to the office for a follow up pulmonary evaluation. \par \par His chief concern is persistent, dry, cough x 4.5 months.\par \par Patient states cough started the day after his Covid vaccination which was 2/18/21.  He states cough occurs intermittently through out the day.  He states cough is worsened by talking for extended periods of time and singing.  He has noticed the length of exhalation decrease during singing, which he attributes to ALS.  He states cough worsens in afternoon and early evening hours.  He states cough does improve when laying down. He admits to constant throat clearing d/t a "tickle below my vocal folds".  Patient follows with Dr. Anderson for ENT and was most recently evaluated on Monday, 6/28/21.  Patient notes Dr. Anderson told him there is no sinus infection and airway near vocal cords appear clear. \par \par Patient states although cough is better when laying down, he does notice that a "whistle sound" occurs.  He states it can resolve with changing his head position by lifting his chin upward.  He notes at nighttime when laying down next to his wife he believes her noe scented lotion may trigger him to feel SOB or as though "my airway closes off". Patient notes his lower extremity mobility and rectus abdominis muscles no longer function and his obliques are weak.\par \par He denies fever or chills. \par \par \par \par

## 2021-08-20 NOTE — PROCEDURE
[FreeTextEntry1] : PFT's performed in office show moderate obstructive lung defect based on FEV1.  There is also a moderate restrictive lung defect and mild decrease in diffusing capacity. \par FEV1: 53% \par FEV1/FVC Ratio: 82% \par FKS65-89%: 70% \par DLCO: 72%\par T% \par \par

## 2021-08-20 NOTE — ASSESSMENT
[FreeTextEntry1] : Mr. CODY is a 66 year old male with a history of ASL, HTN, asthma, OW, arthritis, Kidney stones, Skin CA (melanoma), GERD, A.Fib s/p ablation, Stomach Ulcers who now video calls to the office for an initial pulmonary evaluation. #1 Chronic Cough. \par \par 1. Persistent cough:\par - RX for CXR provided to be done at imaging center; list of image center locations provided @ check out. \par His chronic cough is felt to be multifactorial due to:\par    -Asthma (Therapy as mentioned) \par     -Post nasal drip syndrome/ Allergy \par     -GERD / LPR\par \par 2. Restrictive Dysfunction (ALS):\par - continue with Dr. Taveras Neurologic Evaluations\par - decreased LAQUITA noted. \par - Due to patient's ALS &  decreased LAQUITA, my patient requires NIV in AVAPS-AE. Bipap has been considered and ruled out. NIV device is needed to minimize further clinical decline or likelihood of hospitalizations.\par \par 3. Obstructive lung dysfunction:\par - Nebulizer provided to patient at check out.\par - Add Levalbuterol Q4-6H PRN.\par - Continue Trelegy 1 inhale QD; rinse and gargle post use. Patient notes he has not felt benefit from Trelegy. \par \par 4. LPR/ Globus/ Reflux\par - Continue Pepcid 40 mg QHS \par - Add Omeprazole 40 mg PO QD, 30 mins before breakfast. \par - Recommended for GI evaluation to see if cough is attributed to GERD. \par \par Patient to follow up with Dr. Pérez as scheduled for 9/20/21. \par Patient to call with further questions and concerns.\par Patient verbalizes understanding of care plan and is agreeable.\par \par

## 2021-08-20 NOTE — REVIEW OF SYSTEMS
[Cough] : cough [Wheezing] : wheezing [Seasonal Allergies] : seasonal allergies [Paralysis] : paralysis [Negative] : Endocrine [Chest Tightness] : no chest tightness [Sputum] : no sputum [Dyspnea] : no dyspnea [SOB on Exertion] : no sob on exertion [Headache] : no headache [Dizziness] : no dizziness

## 2021-08-20 NOTE — PHYSICAL EXAM
[No Acute Distress] : no acute distress [Normal Appearance] : normal appearance [No Neck Mass] : no neck mass [Normal Rate/Rhythm] : normal rate/rhythm [Normal S1, S2] : normal s1, s2 [No Murmurs] : no murmurs [No Resp Distress] : no resp distress [Clear to Auscultation Bilaterally] : clear to auscultation bilaterally [No Abnormalities] : no abnormalities [No Clubbing] : no clubbing [No Cyanosis] : no cyanosis [No Edema] : no edema [FROM] : FROM [Normal Color/ Pigmentation] : normal color/ pigmentation [No Focal Deficits] : no focal deficits [Oriented x3] : oriented x3 [Normal Affect] : normal affect [TextBox_99] : Wheelchair bound d/t ALS

## 2021-09-01 ENCOUNTER — TRANSCRIPTION ENCOUNTER (OUTPATIENT)
Age: 66
End: 2021-09-01

## 2021-09-08 ENCOUNTER — APPOINTMENT (OUTPATIENT)
Dept: NEUROLOGY | Facility: CLINIC | Age: 66
End: 2021-09-08
Payer: MEDICARE

## 2021-09-08 VITALS
WEIGHT: 260 LBS | HEIGHT: 74 IN | SYSTOLIC BLOOD PRESSURE: 140 MMHG | HEART RATE: 79 BPM | BODY MASS INDEX: 33.37 KG/M2 | DIASTOLIC BLOOD PRESSURE: 80 MMHG

## 2021-09-08 PROCEDURE — 99214 OFFICE O/P EST MOD 30 MIN: CPT

## 2021-09-10 NOTE — ASSESSMENT
[FreeTextEntry1] : Patient has slowly progressed.  Cont riluzole 50mg BID\par \par Cough assist:  patient requires the use of a cough assist device. I feel it is medically necessary due to his neuromuscular diagnosis for mobilization of secretion and chest wall expansion and to prevent pneumonia and future hospitalizations\par \par f/u ALS clinic in [place]

## 2021-09-10 NOTE — HISTORY OF PRESENT ILLNESS
[FreeTextEntry1] : Patient last seen in June, now can barely use right arm and only the hand.  Has proximal use of the left arm.  No longer standing.  \par \par Wife uses esther for transfer from shower chair to , but from bed to  he uses grasps on wall.  Needs almost full assist with ADL's.  Eating ok without coughing on food.  \par \par Some SOB at night when flat.  Cramps are gone with PT.  \par \par Has some tingling left thigh.  Speech is clear.  Has chronic cough and mucous.

## 2021-09-10 NOTE — PHYSICAL EXAM
[Person] : oriented to person [Place] : oriented to place [Time] : oriented to time [Short Term Intact] : short term memory intact [Remote Intact] : remote memory intact [Registration Intact] : recent registration memory intact [Span Intact] : the attention span was normal [Concentration Intact] : normal concentrating ability [Visual Intact] : visual attention was ~T not ~L decreased [Naming Objects] : no difficulty naming common objects [Repeating Phrases] : no difficulty repeating a phrase [Writing A Sentence] : no difficulty writing a sentence [Fluency] : fluency intact [Comprehension] : comprehension intact [Reading] : reading intact [Past History] : adequate knowledge of personal past history [Cranial Nerves Optic (II)] : visual acuity intact bilaterally,  visual fields full to confrontation, pupils equal round and reactive to light [Cranial Nerves Oculomotor (III)] : extraocular motion intact [Cranial Nerves Trigeminal (V)] : facial sensation intact symmetrically [Cranial Nerves Facial (VII)] : face symmetrical [Cranial Nerves Vestibulocochlear (VIII)] : hearing was intact bilaterally [Cranial Nerves Glossopharyngeal (IX)] : tongue and palate midline [Cranial Nerves Accessory (XI - Cranial And Spinal)] : head turning and shoulder shrug symmetric [Motor Handedness Right-Handed] : the patient is right hand dominant [Hand Weakness Right] : the hand  was weak [4] : shoulder abduction 4/5 [+4] : wrist extension +4/5 [1] : ankle dorsiflexion 1/5 [2] : hip extension 2/5 [-4] : knee extension -4/5 [3] : ankle inversion 3/5 [Sensation Tactile Decrease] : light touch was intact [Proprioception] : proprioception was intact [Vibration Decrease Leg / Foot Right Toes] : decreased at the toes of the right foot [Balance] : balance was intact [3+] : Patella left 3+ [2+] : Ankle jerk right 2+ [0] : Ankle jerk left 0 [Plantar Reflex Right Only] : abnormal on the right [Plantar Reflex Left Only] : abnormal on the left [Hand Weakness Left] : normal hand  [Romberg's Sign] : Romberg's sign was negtive [Vibration Decrease Leg / Foot Left Toes] : normal  at the toes of the left foot [Past-pointing] : there was no past-pointing [Tremor] : no tremor present [___] : absent on the right [___] : absent on the left [FreeTextEntry5] : nml tongue movements with scant tongue fascics noted [FreeTextEntry6] : atrophy right more than left post deltoids, fascics noted back, right proximal arm and calves.  Tone MAS RLE 3/4, LLE 2/4.  ALS-FRS 31/48 [FreeTextEntry8] : spastic gait-  [FreeTextEntry9] : bilateral pectorals, right with spread

## 2021-09-20 ENCOUNTER — APPOINTMENT (OUTPATIENT)
Dept: PULMONOLOGY | Facility: CLINIC | Age: 66
End: 2021-09-20
Payer: MEDICARE

## 2021-09-20 VITALS
OXYGEN SATURATION: 98 % | BODY MASS INDEX: 33.37 KG/M2 | RESPIRATION RATE: 16 BRPM | WEIGHT: 260 LBS | DIASTOLIC BLOOD PRESSURE: 78 MMHG | SYSTOLIC BLOOD PRESSURE: 120 MMHG | HEART RATE: 76 BPM | HEIGHT: 74 IN | TEMPERATURE: 97.2 F

## 2021-09-20 DIAGNOSIS — J45.901 UNSPECIFIED ASTHMA WITH (ACUTE) EXACERBATION: ICD-10-CM

## 2021-09-20 PROCEDURE — 99214 OFFICE O/P EST MOD 30 MIN: CPT

## 2021-09-20 NOTE — PROCEDURE
[FreeTextEntry1] : FULL PFTs reveals moderate restrictive dysfunction; FEV1 was  1.99 L which is   53% of predicted; moderately reduced lung volumes; low normal diffusion of 20.7 , which is  72% of predicted but when corrected at 159%; normal flow volume loop

## 2021-09-20 NOTE — REASON FOR VISIT
[Follow-Up] : a follow-up visit [TextBox_44] : restrictive dysfunction (ALS), mild asthma, Allergies, LPR/ Globus, ?ARELY

## 2021-09-20 NOTE — ADDENDUM
[FreeTextEntry1] : Documented by Memo Orellana acting as a scribe for Dr. Christiano Pérez on 09/20/2021 \par \par All medical record entries made by the Scribe were at my, Dr. Christiano Pérez's, direction and personally dictated by me on 09/20/2021 . I have reviewed the chart and agree that the record accurately reflects my personal performance of the history, physical exam, assessment and plan. I have also personally directed, reviewed, and agree with the discharge instructions

## 2021-09-20 NOTE — ASSESSMENT
[FreeTextEntry1] : Mr. CODY is a 66 year old male with a history of ASL, HTN, asthma, OW, arthritis, Kidney stones, Skin CA (melanoma), GERD, Stomach Ulcers present in the office for an follow up pulmonary evaluation. #1 Chronic Cough, #2 SOB-Asthma, restrictive dysfunction. ?ARELY\par \par His shortness of breath is multifactorial due to:\par -poor mechanics of breathing \par -out of shape / overweight\par -pulmonary disease\par - Restrictive dysfunction, mild asthma \par -?cardiac disease \par \par His chronic cough is felt to be multifactorial due to:\par -Asthma (Therapy as mentioned) \par -Post nasal drip syndrome/ Allergy \par -GERD / LPR\par \par problem 1: Restrictive Dysfunction (ALS) \par -complete regular PFTs and LAQUITA\par -continue Dr. Taveras Neurologic Evaluations\par -Candidate for [id number]\par -Trilogy (HSS sut)\par \par Problem 2: mild Asthma\par -add Singulair 10 mg QHS \par -NC Trelegy 200 1 inhalation QD \par Asthma is believed to be caused by inherited (genetic) and environmental factor, but its exact cause is unknown. Asthma may be triggered by allergens, lung infections, or irritants in the air. Asthma triggers are different for each person \par -Inhaler technique reviewed as well as oral hygiene techniques reviewed with patient. Avoidance of cold air, extremes of temperature, rescue inhaler should be used before exercise. Order of medication reviewed with patient. Recommended use of a cool mist humidifier in the bedroom. \par \par Problem 3: Allergies\par -add Ryvent 6mg QHS\par -get Blood work to include: asthma panel, food IgE panel, IgE level, eosinophil level, vitamin D level \par -continue Olopatadine 0.6% at 1 sniff/nostril BID \par Environmental measures for allergies were encouraged including mattress and pillow covers, air purifier, and environmental controls.\par \par Problem 3A: Low Vitamin D\par -on Rx\par Has been associated with asthma exacerbations and increased allergic symptoms. The goal based on recent information is maintaining levels between 50-70 and low normal is 30. Recommended 50,000 units every two weeks to once a month depending on the level.\par \par Problem 4: LPR/ Globus/ Reflux\par -continue Pepcid 40 mg QHS \par -Rule of 2s: avoid eating too much, eating too late, eating too spicy, eating two hours before bed.\par -Things to avoid including overeating, spicy foods, tight clothing, eating within three hours of bed, this list is not all inclusive. \par -For treatment of reflux, possible options discussed including diet control, H2 blockers, PPIs, as well as coating motility agents discussed as treatment options. Timing of meals and proximity of last meal to sleep were discussed. If symptoms persist, a formal gastrointestinal evaluation is needed.\par \par Problem 5: ?ARELY\par -complete Home Sleep study (rescripted \par Sleep apnea is associated with adverse clinical consequences which can affect most organ systems. Cardiovascular disease risk includes arrhythmias, atrial fibrillation, hypertension, coronary artery disease, and stroke. Metabolic disorders include diabetes type 2, non-alcoholic fatty liver disease. Mood disorder especially depression; and cognitive decline especially in the elderly. Associations with chronic reflux/Cortes’s esophagus some but not all inclusive. \par -Reasons include arousal consistent with hypopnea; respiratory events most prominent in REM sleep or supine position; therefore sleep staging and body position are important for accurate diagnosis and estimation of AHI. \par \par Treatment options discussed including CPAP/BiPAP machine, oral appliance, ProVent therapy, Oxy-Aid by Respitec, new technologies, or positional sleep.Recommended use of the CPAP machine for moderate (AHI >15), moderate to severe (AHI 15-30) and severe patients (AHI > 30). Recommended weight loss which can reduce AHI especially in weight loss of greater than 5% of BMI. Positional sleep is recommended in those with low AHI, low-moderate BMI, and younger age. For severe sleep apnea, the hypoglossal nerve stimulator was recommended as well. \par \par problem 6: cardiac disease\par -recommended to continue to follow up with Cardiologist \par \par problem 7: poor breathing mechanics\par -Proper breathing techniques were reviewed with an emphasis of exhalation. Patient instructed to breath in for 1 second and out for four seconds. Patient was encouraged to not talk while walking. \par \par problem 8: out of shape / overweight\par -Weight loss, exercise, and diet control were discussed and are highly encouraged. Treatment options were given such as, aqua therapy, and contacting a nutritionist. Recommended to use the elliptical, stationary bike, less use of treadmill. Mindful eating was explained to the patient Obesity is associated with worsening asthma, shortness of breath, and potential for cardiac disease, diabetes, and other underlying medical conditions\par \par problem 9: health maintenance \par -recommended yearly flu shot after October 15\par -recommended strep pneumonia vaccines: Prevnar-13 vaccine, followed by Pneumo vaccine 23 one year following after 65\par -recommended early intervention for Upper Respiratory Infections (URIs)\par -recommended regular osteoporosis evaluations\par -recommended early dermatological evaluations\par -recommended after the age of 50 to the age of 70, colonoscopy every 5 years\par \par F/U in 6-8 weeks.\par He is encouraged to call with any changes, concerns, or questions\par

## 2021-09-20 NOTE — HISTORY OF PRESENT ILLNESS
[TextBox_4] : Mr. CODY is a 66 year old male video calling to the office today for follow up pulmonary evaluation. His chief complaint is\par \par -he notes cough has improved\par -he notes cough brought on by laughing \par -He notes intermittent constipation improved with MiraLAX\par -he denies palpations\par -he notes sinus congestion  with PND when eating spicy foods\par -he notes energy level 3-4/10\par -he notes sleep is stable and getting enough sleep of >8 hrs\par -he notes difficulty breathing due to ALS when sleeping\par -he notes difficulty exhaling when sleeping \par -he notes appetite is stable\par -he notes stopping radicava and trelegy  \par \par - He  denies any visual issues, headaches, nausea, vomiting, fever, chills, sweats, chest pains, chest pressure, diarrhea, constipation, dysphagia, myalgia, dizziness, leg swelling, leg pain, itchy eyes, itchy ears, heartburn, reflux, or sour taste in the mouth.

## 2021-09-20 NOTE — PHYSICAL EXAM
[No Acute Distress] : no acute distress [Normal Oropharynx] : normal oropharynx [Normal Appearance] : normal appearance [Normal Rate/Rhythm] : normal rate/rhythm [No Neck Mass] : no neck mass [Normal S1, S2] : normal s1, s2 [No Murmurs] : no murmurs [No Resp Distress] : no resp distress [Clear to Auscultation Bilaterally] : clear to auscultation bilaterally [No Abnormalities] : no abnormalities [Benign] : benign [Normal Gait] : normal gait [No Clubbing] : no clubbing [No Cyanosis] : no cyanosis [No Edema] : no edema [FROM] : FROM [Normal Color/ Pigmentation] : normal color/ pigmentation [No Focal Deficits] : no focal deficits [Oriented x3] : oriented x3 [Normal Affect] : normal affect [II] : Mallampati Class: II [TextBox_2] : OW , wheelchair  [TextBox_68] : I:E 1:3, clear  [TextBox_105] : 1+ LE edema

## 2021-10-04 ENCOUNTER — TRANSCRIPTION ENCOUNTER (OUTPATIENT)
Age: 66
End: 2021-10-04

## 2021-10-05 ENCOUNTER — TRANSCRIPTION ENCOUNTER (OUTPATIENT)
Age: 66
End: 2021-10-05

## 2021-11-05 ENCOUNTER — TRANSCRIPTION ENCOUNTER (OUTPATIENT)
Age: 66
End: 2021-11-05

## 2021-11-09 ENCOUNTER — APPOINTMENT (OUTPATIENT)
Dept: PULMONOLOGY | Facility: CLINIC | Age: 66
End: 2021-11-09
Payer: MEDICARE

## 2021-11-09 VITALS
DIASTOLIC BLOOD PRESSURE: 88 MMHG | HEART RATE: 91 BPM | SYSTOLIC BLOOD PRESSURE: 128 MMHG | WEIGHT: 285 LBS | HEIGHT: 74 IN | OXYGEN SATURATION: 98 % | BODY MASS INDEX: 36.57 KG/M2 | TEMPERATURE: 96.4 F

## 2021-11-09 PROCEDURE — 99214 OFFICE O/P EST MOD 30 MIN: CPT | Mod: 25

## 2021-11-09 PROCEDURE — 90732 PPSV23 VACC 2 YRS+ SUBQ/IM: CPT

## 2021-11-09 PROCEDURE — G0009: CPT

## 2021-11-09 NOTE — PROCEDURE
[FreeTextEntry1] : Sleep study (10/14/2021) revealed sleep apnea with an AHI/PAULINE of 32.8, and a low oxygen saturation of 77%\par  \par -Images and procedures reviewed in detail and discussed with patient.

## 2021-11-09 NOTE — ASSESSMENT
[FreeTextEntry1] : Mr. CODY is a 66 year old male with a history of ALS, HTN, asthma, OW, arthritis, Kidney stones, Skin CA (melanoma), GERD, Stomach Ulcers present in the office for an follow up pulmonary evaluation. #1 Chronic Cough, #2 SOB-Asthma, restrictive dysfunction. ?ARELY\par \par His shortness of breath is multifactorial due to:\par -poor mechanics of breathing \par -out of shape / overweight\par -pulmonary disease\par - Restrictive dysfunction, mild asthma \par -?cardiac disease \par \par His chronic cough is felt to be multifactorial due to:\par -Asthma (Therapy as mentioned) \par -Post nasal drip syndrome/ Allergy \par -GERD / LPR\par \par problem 1: Restrictive Dysfunction (ALS) \par -complete regular PFTs and LAQUITA\par -continue Dr. Taveras Neurologic Evaluations\par -Astral ([employer])\par \par Problem 1A: OSAS (severe)\par -Set up Astral ([facility])\par \par Problem 2: mild Asthma\par -add Singulair 10 mg QHS \par -NC Trelegy 200 1 inhalation QD \par Asthma is believed to be caused by inherited (genetic) and environmental factor, but its exact cause is unknown. Asthma may be triggered by allergens, lung infections, or irritants in the air. Asthma triggers are different for each person \par -Inhaler technique reviewed as well as oral hygiene techniques reviewed with patient. Avoidance of cold air, extremes of temperature, rescue inhaler should be used before exercise. Order of medication reviewed with patient. Recommended use of a cool mist humidifier in the bedroom. \par \par Problem 3: Allergies\par -add Ryvent 6mg QHS\par -get Blood work to include: asthma panel, food IgE panel, IgE level, eosinophil level, vitamin D level \par -continue Olopatadine 0.6% at 1 sniff/nostril BID \par Environmental measures for allergies were encouraged including mattress and pillow covers, air purifier, and environmental controls.\par \par Problem 3A: Low Vitamin D\par -on Rx\par Has been associated with asthma exacerbations and increased allergic symptoms. The goal based on recent information is maintaining levels between 50-70 and low normal is 30. Recommended 50,000 units every two weeks to once a month depending on the level.\par \par Problem 4: LPR/ Globus/ Reflux\par -continue Pepcid 40 mg QHS \par -Rule of 2s: avoid eating too much, eating too late, eating too spicy, eating two hours before bed.\par -Things to avoid including overeating, spicy foods, tight clothing, eating within three hours of bed, this list is not all inclusive. \par -For treatment of reflux, possible options discussed including diet control, H2 blockers, PPIs, as well as coating motility agents discussed as treatment options. Timing of meals and proximity of last meal to sleep were discussed. If symptoms persist, a formal gastrointestinal evaluation is needed.\par \par Problem 5: (+) ARELY - severe\par -s/p Home Sleep study 10/2021 - Astral pending\par Sleep apnea is associated with adverse clinical consequences which can affect most organ systems. Cardiovascular disease risk includes arrhythmias, atrial fibrillation, hypertension, coronary artery disease, and stroke. Metabolic disorders include diabetes type 2, non-alcoholic fatty liver disease. Mood disorder especially depression; and cognitive decline especially in the elderly. Associations with chronic reflux/Cortes’s esophagus some but not all inclusive. \par -Reasons include arousal consistent with hypopnea; respiratory events most prominent in REM sleep or supine position; therefore sleep staging and body position are important for accurate diagnosis and estimation of AHI. \par \par Treatment options discussed including CPAP/BiPAP machine, oral appliance, ProVent therapy, Oxy-Aid by Respitec, new technologies, or positional sleep.Recommended use of the CPAP machine for moderate (AHI >15), moderate to severe (AHI 15-30) and severe patients (AHI > 30). Recommended weight loss which can reduce AHI especially in weight loss of greater than 5% of BMI. Positional sleep is recommended in those with low AHI, low-moderate BMI, and younger age. For severe sleep apnea, the hypoglossal nerve stimulator was recommended as well. \par \par problem 6: cardiac disease\par -recommended to continue to follow up with Cardiologist if needed\par \par problem 7: poor breathing mechanics\par -Proper breathing techniques were reviewed with an emphasis of exhalation. Patient instructed to breath in for 1 second and out for four seconds. Patient was encouraged to not talk while walking. \par \par problem 8: out of shape / overweight\par -Recommend "Muniq" OTC Supplement for weight loss, energy levels, and blood sugar levels \par -Weight loss, exercise, and diet control were discussed and are highly encouraged. Treatment options were given such as, aqua therapy, and contacting a nutritionist. Recommended to use the elliptical, stationary bike, less use of treadmill. Mindful eating was explained to the patient Obesity is associated with worsening asthma, shortness of breath, and potential for cardiac disease, diabetes, and other underlying medical conditions\par \par problem 9: health maintenance \par -s/p COVID-19 vaccine x3\par -Recommend Dr. Hodges's Intestinal Formula for constipation\par -recommended yearly flu shot after October 15 2021\par -recommended strep pneumonia vaccines: Prevnar-13 vaccine (3/2020), followed by Pneumo vaccine 23 one year following after 65 (11/2021)\par -recommended early intervention for Upper Respiratory Infections (URIs)\par -recommended regular osteoporosis evaluations\par -recommended early dermatological evaluations\par -recommended after the age of 50 to the age of 70, colonoscopy every 5 years\par \par F/U in 6-8 weeks.\par He is encouraged to call with any changes, concerns, or questions\par

## 2021-11-09 NOTE — HISTORY OF PRESENT ILLNESS
[TextBox_4] : Mr. CODY is a 66 year old male presenting to the office today for follow up pulmonary evaluation. His chief complaint is\par -he notes his cough has improved after using the cough assist device\par -he notes sleeping fine\par -he notes some constipation (bowel movement every 2-3 days)\par -he notes his energy level is pretty good right now\par -he notes having energy but his muscles tire\par -he notes his legs don't function at all, his right arm is almost gone, but still has use of his left arm\par -he notes getting a sleep apnea test and wants to go over the results\par -he notes his reflux is quiet on Rx\par -he notes Miralax can help create a bowel movement the next day after taking it\par -he notes getting 9-10 hours of sleep per night but today he only had 8\par -he notes sleeping through most of the night\par -no new medications, vitamins, or supplements \par -he notes his appetite is good\par -s/p COVID-19 vaccine\par -s/p flu shot 2021\par -s/p prevnar but he still needs pneumovax\par \par patient denies any headaches, nausea, vomiting, fever, chills, sweats, chest pain, chest pressure, palpitations, coughing, wheezing, fatigue, diarrhea, dysphagia, myalgias, dizziness, leg swelling, leg pain, itchy eyes, itchy ears, heartburn, reflux or sour taste in the mouth

## 2021-11-09 NOTE — PHYSICAL EXAM
[No Acute Distress] : no acute distress [Normal Oropharynx] : normal oropharynx [II] : Mallampati Class: II [Normal Appearance] : normal appearance [No Neck Mass] : no neck mass [Normal Rate/Rhythm] : normal rate/rhythm [Normal S1, S2] : normal s1, s2 [No Murmurs] : no murmurs [No Resp Distress] : no resp distress [Clear to Auscultation Bilaterally] : clear to auscultation bilaterally [No Abnormalities] : no abnormalities [Benign] : benign [Normal Gait] : normal gait [No Clubbing] : no clubbing [No Cyanosis] : no cyanosis [No Edema] : no edema [FROM] : FROM [Normal Color/ Pigmentation] : normal color/ pigmentation [No Focal Deficits] : no focal deficits [Oriented x3] : oriented x3 [Normal Affect] : normal affect [TextBox_2] : OW , wheelchair  [TextBox_68] : I:E 1:3, clear  [TextBox_105] : 2+ dependent edema to the mid calf

## 2021-11-09 NOTE — ADDENDUM
[FreeTextEntry1] : Documented by Jorge Luis Sheehan acting as a scribe for Dr. Christiano Pérez on (11/09/2021).\par \par All medical record entries made by the Scribe were at my, Dr. Christiano Pérez's, direction and personally dictated by me on (11/09/2021). I have reviewed the chart and agree that the record accurately reflects my personal performance of the history, physical exam, assessment and plan. I have also personally directed, reviewed, and agree with the discharge instructions.\par

## 2021-12-13 ENCOUNTER — TRANSCRIPTION ENCOUNTER (OUTPATIENT)
Age: 66
End: 2021-12-13

## 2022-01-10 ENCOUNTER — TRANSCRIPTION ENCOUNTER (OUTPATIENT)
Age: 67
End: 2022-01-10

## 2022-01-10 ENCOUNTER — APPOINTMENT (OUTPATIENT)
Dept: NEUROLOGY | Facility: CLINIC | Age: 67
End: 2022-01-10
Payer: MEDICARE

## 2022-01-10 ENCOUNTER — NON-APPOINTMENT (OUTPATIENT)
Age: 67
End: 2022-01-10

## 2022-01-10 DIAGNOSIS — M62.81 MUSCLE WEAKNESS (GENERALIZED): ICD-10-CM

## 2022-01-10 PROCEDURE — 99215 OFFICE O/P EST HI 40 MIN: CPT | Mod: 25,95

## 2022-01-10 NOTE — ASSESSMENT
[FreeTextEntry1] : Amyotrophic lateral sclerosis (G 12.21) (335.20)\par \par Evaluated by me today in multidisciplinary ALS clinic; required evaluations today by PT/OT/speech and swallowing therapists.  Social work needs addressed and goals of care reinforced.  End-of-life care including healthcare proxy and patient wishes were discussed.\par \par Nutritional/dietary needs discussed and addressed.None needed\par \par Pulmonary function will be assessed by Dr Pérez  by respiratory therapy and spirometry.  Patient's respiratory function may require BIPAP intermittently \par \par Saliva management needs assessed - none \par \par  PT/OT recommends: Upper extremity range of motion to keep joint functioning.  Hand range of motion and muscle strengthening.  Because his wife has noticed reddening of the skin on the right sacral area where he tends to lean to the right side, recommend air mattress at bed to prevent bedsores.  Also recommend wheelchair clinic assessment for needs such as seat cushions and braces or supports to his back to prevent leaning to the right.  Continue physical therapy\par \par  Speech swallow therapy recommends: None\par \par  Continue riluzole 50 mg twice daily \par \par  Refill \par \par  Follow-up in 6 months at ALS clinic \par \par

## 2022-01-10 NOTE — PHYSICAL EXAM
[FreeTextEntry1] : No movements in his hips knees and feet with preserved sensation.  He is unable to lift his hands above his shoulder level but can make a fist and hold his utensils and hand.  The left hand is stronger than the right.  Cranial nerves are normal.

## 2022-01-10 NOTE — REASON FOR VISIT
[Home] : at home, [unfilled] , at the time of the visit. [Medical Office: ([facility])___] : at the medical office located in  [Spouse] : spouse [Verbal consent obtained from patient] : the patient, [unfilled]

## 2022-01-10 NOTE — HISTORY OF PRESENT ILLNESS
[FreeTextEntry1] : Visit for follow-up of amyotrophic lateral sclerosis was conducted by telehealth on request of the patient.\par \par He reports his speech and salivation are normal.  Eating habits are normal and he has not had to change the consistency of foods to avoid choking.  Handwriting is slow but all words are legible.  He does occasionally need help to cut his food and counting and feeding is slower clumsy.  He needs  assistance for all self-care.\par He cannot turn in bed.  He cannot adjust his sheets in bed.  He is paraplegic and cannot stand or walk.  Occasionally dyspnea occurs when he is eating or bathing or dressing.  He does need extra pillows to sleep at night but he does not use BiPAP device as yet.  However, he is scheduled to meet with pulmonary medicine to evaluate for BiPAP needs.\par His wife is concerned that his skin is showing signs of breaking down and would like a low flow air mattress to prevent bedsores because of redness of skin on his right sacral area.\par Tends to lean to the right seated; wonders if a brace can keep him straight. thighs are spreading out tand knees are hitting the guards.\par \par

## 2022-01-11 ENCOUNTER — APPOINTMENT (OUTPATIENT)
Dept: PULMONOLOGY | Facility: CLINIC | Age: 67
End: 2022-01-11
Payer: MEDICARE

## 2022-01-11 ENCOUNTER — NON-APPOINTMENT (OUTPATIENT)
Age: 67
End: 2022-01-11

## 2022-01-11 VITALS
SYSTOLIC BLOOD PRESSURE: 130 MMHG | RESPIRATION RATE: 16 BRPM | BODY MASS INDEX: 36.57 KG/M2 | WEIGHT: 285 LBS | DIASTOLIC BLOOD PRESSURE: 80 MMHG | HEART RATE: 65 BPM | OXYGEN SATURATION: 97 % | HEIGHT: 74 IN | TEMPERATURE: 97.2 F

## 2022-01-11 PROCEDURE — 95012 NITRIC OXIDE EXP GAS DETER: CPT

## 2022-01-11 PROCEDURE — 94010 BREATHING CAPACITY TEST: CPT

## 2022-01-11 PROCEDURE — 99214 OFFICE O/P EST MOD 30 MIN: CPT | Mod: 25

## 2022-01-11 NOTE — ASSESSMENT
[FreeTextEntry1] : Mr. CODY is a 66 year old male with a history of ALS, HTN, asthma, OW, arthritis, Kidney stones, Skin CA (melanoma), GERD, Stomach Ulcers present in the office for an follow up pulmonary evaluation. #1 Chronic Cough, #2 SOB-Asthma, restrictive dysfunction, LAQUITA weakness. (+)ARELY severe - NC with astral device\par \par His shortness of breath is multifactorial due to:\par -poor mechanics of breathing \par -out of shape / overweight\par -pulmonary disease\par - Restrictive dysfunction, mild asthma \par -?cardiac disease \par \par His chronic cough is felt to be multifactorial due to:\par -Asthma (Therapy as mentioned) \par -Post nasal drip syndrome/ Allergy \par -GERD / LPR\par \par problem 1: Restrictive Dysfunction (ALS) \par -complete regular PFTs and LAQUITA\par -continue Dr. Taveras Neurologic Evaluations\par -Astral ([facility])\par \par Problem 1A: OSAS (severe)\par -Set up Astral ([facility]) - pending (NC)\par \par Problem 2: mild Asthma\par -continue Singulair 10 mg QHS \par -NC Trelegy 200 1 inhalation QD \par Asthma is believed to be caused by inherited (genetic) and environmental factor, but its exact cause is unknown. Asthma may be triggered by allergens, lung infections, or irritants in the air. Asthma triggers are different for each person \par -Inhaler technique reviewed as well as oral hygiene techniques reviewed with patient. Avoidance of cold air, extremes of temperature, rescue inhaler should be used before exercise. Order of medication reviewed with patient. Recommended use of a cool mist humidifier in the bedroom. \par \par Problem 3: Allergies\par -continue Ryvent 6mg QHS\par -get Blood work to include: asthma panel, food IgE panel, IgE level, eosinophil level, vitamin D level (NC)\par -continue Olopatadine 0.6% at 1 sniff/nostril BID \par Environmental measures for allergies were encouraged including mattress and pillow covers, air purifier, and environmental controls.\par \par Problem 3A: Low Vitamin D\par -on Rx\par Has been associated with asthma exacerbations and increased allergic symptoms. The goal based on recent information is maintaining levels between 50-70 and low normal is 30. Recommended 50,000 units every two weeks to once a month depending on the level.\par \par Problem 4: LPR/ Globus/ Reflux\par -continue Pepcid 40 mg QHS \par -Rule of 2s: avoid eating too much, eating too late, eating too spicy, eating two hours before bed.\par -Things to avoid including overeating, spicy foods, tight clothing, eating within three hours of bed, this list is not all inclusive. \par -For treatment of reflux, possible options discussed including diet control, H2 blockers, PPIs, as well as coating motility agents discussed as treatment options. Timing of meals and proximity of last meal to sleep were discussed. If symptoms persist, a formal gastrointestinal evaluation is needed.\par \par Problem 5: (+) ARELY - severe\par -s/p Home Sleep study 10/2021 - Astral pending\par Sleep apnea is associated with adverse clinical consequences which can affect most organ systems. Cardiovascular disease risk includes arrhythmias, atrial fibrillation, hypertension, coronary artery disease, and stroke. Metabolic disorders include diabetes type 2, non-alcoholic fatty liver disease. Mood disorder especially depression; and cognitive decline especially in the elderly. Associations with chronic reflux/Cortes’s esophagus some but not all inclusive. \par -Reasons include arousal consistent with hypopnea; respiratory events most prominent in REM sleep or supine position; therefore sleep staging and body position are important for accurate diagnosis and estimation of AHI. \par \par Treatment options discussed including CPAP/BiPAP machine, oral appliance, ProVent therapy, Oxy-Aid by [employer], new technologies, or positional sleep.Recommended use of the CPAP machine for moderate (AHI >15), moderate to severe (AHI 15-30) and severe patients (AHI > 30). Recommended weight loss which can reduce AHI especially in weight loss of greater than 5% of BMI. Positional sleep is recommended in those with low AHI, low-moderate BMI, and younger age. For severe sleep apnea, the hypoglossal nerve stimulator was recommended as well. \par \par problem 6: cardiac disease\par -recommended to continue to follow up with Cardiologist if needed\par \par problem 7: poor breathing mechanics\par -Recommend "Breather" device\par -Recommended Wim Hof and Buteyko breathing techniques \par -Proper breathing techniques were reviewed with an emphasis of exhalation. Patient instructed to breath in for 1 second and out for four seconds. Patient was encouraged to not talk while walking. \par \par problem 8: out of shape / overweight\par -Recommend "Muniq" OTC Supplement for weight loss, energy levels, and blood sugar levels \par -Weight loss, exercise, and diet control were discussed and are highly encouraged. Treatment options were given such as, aqua therapy, and contacting a nutritionist. Recommended to use the elliptical, stationary bike, less use of treadmill. Mindful eating was explained to the patient Obesity is associated with worsening asthma, shortness of breath, and potential for cardiac disease, diabetes, and other underlying medical conditions\par \par problem 9: health maintenance \par -s/p COVID-19 vaccine x3\par -Recommend Dr. Hodges's Intestinal Formula for constipation\par -recommended yearly flu shot after October 15 2021\par -recommended strep pneumonia vaccines: Prevnar-13 vaccine (3/2020), followed by Pneumo vaccine 23 one year following after 65 (11/2021)\par -recommended early intervention for Upper Respiratory Infections (URIs)\par -recommended regular osteoporosis evaluations\par -recommended early dermatological evaluations\par -recommended after the age of 50 to the age of 70, colonoscopy every 5 years\par \par F/U in 4 months.\par He is encouraged to call with any changes, concerns, or questions\par

## 2022-01-11 NOTE — HISTORY OF PRESENT ILLNESS
[TextBox_4] : Mr. CODY is a 66 year old male presenting to the office today for follow up pulmonary evaluation. His chief complaint is\par -he notes he has not gotten the CPAP yet\par -he notes the company called him in the summer but he did not follow-up\par -he notes a sour taste in his mouth on rare occasions usually when he sleeps\par -he notes his energy level is 6/10\par -he notes getting 10 hours of sleep per night\par -he notes his wife has to roll him over twice which he wakes up during but falls back asleep after\par -he notes puffing sometimes when he breaths\par -he notes his sense of smell and taste are normal \par -he notes testing negative recently for COVID and denies ever having it\par -s/p COVID-19 vaccine x3\par -he notes hoarseness occasionally\par -he notes feeling more SOB since his previous visit\par -he notes he lost the ability to hold his breath\par -he notes he never got the blood work \par -no new medications, vitamins, or supplements \par -he notes he is eating well\par -he denies coughing at this time\par \par -patient denies any headaches, nausea, vomiting, fever, chills, sweats, chest pain, chest pressure, palpitations, coughing, wheezing, fatigue, diarrhea, constipation, dysphagia, myalgias, dizziness, leg swelling, leg pain, itchy eyes, itchy ears, heartburn, reflux

## 2022-01-11 NOTE — REASON FOR VISIT
[Follow-Up] : a follow-up visit [TextBox_44] : restrictive dysfunction (ALS), mild asthma, Allergies, LPR/ Globus, +ARELY

## 2022-01-11 NOTE — PHYSICAL EXAM
[No Acute Distress] : no acute distress [Normal Oropharynx] : normal oropharynx [II] : Mallampati Class: II [Normal Appearance] : normal appearance [No Neck Mass] : no neck mass [Normal Rate/Rhythm] : normal rate/rhythm [Normal S1, S2] : normal s1, s2 [No Murmurs] : no murmurs [No Resp Distress] : no resp distress [Clear to Auscultation Bilaterally] : clear to auscultation bilaterally [No Abnormalities] : no abnormalities [Benign] : benign [Normal Gait] : normal gait [No Clubbing] : no clubbing [No Cyanosis] : no cyanosis [FROM] : FROM [Normal Color/ Pigmentation] : normal color/ pigmentation [No Focal Deficits] : no focal deficits [Oriented x3] : oriented x3 [Normal Affect] : normal affect [TextBox_2] : OW , wheelchair  [TextBox_68] : I:E 1:3, decreased breath sounds at the bases [TextBox_105] : 2+ LE edema

## 2022-01-11 NOTE — ADDENDUM
[FreeTextEntry1] : Documented by Jorge Luis Sheehan acting as a scribe for Dr. Christiano Pérez on 01/11/2022\par \par All medical record entries made by the scribe were at my, Dr. Christiano Pérez's, direction and personally dictated by me on 01/11/2022. I have reviewed the chart and agree that the record accurately reflects my personal performance of the history, physical exam, assessment, and plan. I have also personally directed, reviewed, and agree with the discharge instructions.

## 2022-01-11 NOTE — PROCEDURE
[FreeTextEntry1] : PFT revealed moderate restrictive dysfunction, with a FEV1 of 1.95L, which is 50% of predicted, with a normal flow volume loop \par \par LAQUITA revealed moderately reduced PI Max of 53% and PE Max of 52%\par \par FENO was 9; a normal value being less than 25. Fractional exhaled nitric oxide (FENO) is regarded as a simple, noninvasive method for assessing eosinophilic airway inflammation. Produced by a variety of cells within the lung, nitric oxide (NO) concentrations are generally low in healthy individuals. However, high concentrations of NO appear to be involved in nonspecific host defense mechanisms and chronic inflammatory  diseases such as asthma. The American Thoracic Society (ATS) therefore recommended using FENO to aid in the diagnosis and monitoring of eosinophilic airway inflammation and asthma, and for identifying steroid responsive individuals whose chronic respiratory symptoms may be caused by airway inflammation \par \par Sleep Study (10/14/2021) revealed sleep apnea with an AHI/PAULINE of 32.8, and a low oxygen saturation of 77%\par \par -Images and procedures reviewed in detail and discussed with patient.

## 2022-02-14 ENCOUNTER — RX RENEWAL (OUTPATIENT)
Age: 67
End: 2022-02-14

## 2022-04-11 ENCOUNTER — APPOINTMENT (OUTPATIENT)
Dept: NEUROLOGY | Facility: CLINIC | Age: 67
End: 2022-04-11
Payer: MEDICARE

## 2022-04-11 VITALS
HEIGHT: 62 IN | WEIGHT: 280 LBS | DIASTOLIC BLOOD PRESSURE: 88 MMHG | SYSTOLIC BLOOD PRESSURE: 126 MMHG | RESPIRATION RATE: 16 BRPM | BODY MASS INDEX: 51.53 KG/M2 | HEART RATE: 90 BPM

## 2022-04-11 PROCEDURE — 99213 OFFICE O/P EST LOW 20 MIN: CPT

## 2022-04-12 NOTE — PHYSICAL EXAM
[Person] : oriented to person [Place] : oriented to place [Time] : oriented to time [Short Term Intact] : short term memory intact [Remote Intact] : remote memory intact [Span Intact] : the attention span was normal [Concentration Intact] : normal concentrating ability [Fluency] : fluency intact [Comprehension] : comprehension intact [Cranial Nerves Optic (II)] : visual acuity intact bilaterally,  visual fields full to confrontation, pupils equal round and reactive to light [Cranial Nerves Oculomotor (III)] : extraocular motion intact [Cranial Nerves Trigeminal (V)] : facial sensation intact symmetrically [Cranial Nerves Facial (VII)] : face symmetrical [Cranial Nerves Vestibulocochlear (VIII)] : hearing was intact bilaterally [Cranial Nerves Glossopharyngeal (IX)] : tongue and palate midline [Cranial Nerves Accessory (XI - Cranial And Spinal)] : head turning and shoulder shrug symmetric [Cranial Nerves Hypoglossal (XII)] : there was no tongue deviation with protrusion [Cranial Nerves Facial Peripheral - Right Only] : peripheral 7th nerve weakness [Cranial Nerves Right Facial: House Grade ___(1-6)] : grade V (severe, 20%) facial nerve function [Cranial Nerves Facial Peripheral - Left Only] : peripheral 7th nerve weakness [Cranial Nerves Left Facial: House Grade ___(1-6)] : grade V (severe, 20%) facial nerve function [Motor Handedness Right-Handed] : the patient is right hand dominant [Motor Strength Upper Extremities Bilaterally] : there was weakness in both upper extremities [Motor Strength Lower Extremities Bilaterally] : there was weakness in both lower extremities [1] : knee extension 1/5 [Vibration Decrease Leg / Foot Both Ankles] : decreased at both ankles [Vibration Decrease Leg / Foot Toes Both Feet] : decreased at the toes of both feet  [Position Sensation Decrease Leg/Foot At Level Of Toes] : impaired at the toes in both legs [Limited Balance] : the patient's balance was impaired [Dysdiadochokinesia Bilaterally] : not present [1+] : Triceps right 1+ [0] : Ankle jerk left 0

## 2022-04-12 NOTE — ASSESSMENT
[FreeTextEntry1] : Regular solids and thin liquids; hardly any dysarthria\par NIV and cough assist nocturnal and as needed during the day. hospital bed, 97%, 38, 88, 15, 2.4, -55, 300\par [employer] RT \par Condom catheter.

## 2022-05-16 ENCOUNTER — TRANSCRIPTION ENCOUNTER (OUTPATIENT)
Age: 67
End: 2022-05-16

## 2022-05-24 ENCOUNTER — APPOINTMENT (OUTPATIENT)
Dept: PULMONOLOGY | Facility: CLINIC | Age: 67
End: 2022-05-24
Payer: MEDICARE

## 2022-05-24 VITALS
BODY MASS INDEX: 51.21 KG/M2 | OXYGEN SATURATION: 97 % | SYSTOLIC BLOOD PRESSURE: 120 MMHG | DIASTOLIC BLOOD PRESSURE: 80 MMHG | TEMPERATURE: 97.5 F | RESPIRATION RATE: 16 BRPM | HEART RATE: 78 BPM | WEIGHT: 280 LBS

## 2022-05-24 DIAGNOSIS — R05.3 CHRONIC COUGH: ICD-10-CM

## 2022-05-24 PROCEDURE — 95012 NITRIC OXIDE EXP GAS DETER: CPT

## 2022-05-24 PROCEDURE — 94729 DIFFUSING CAPACITY: CPT

## 2022-05-24 PROCEDURE — 94799 UNLISTED PULMONARY SVC/PX: CPT

## 2022-05-24 PROCEDURE — 99214 OFFICE O/P EST MOD 30 MIN: CPT | Mod: 25

## 2022-05-24 PROCEDURE — 94727 GAS DIL/WSHOT DETER LNG VOL: CPT

## 2022-05-24 PROCEDURE — 94010 BREATHING CAPACITY TEST: CPT

## 2022-05-24 NOTE — ADDENDUM
[FreeTextEntry1] : Documented by Aly Miller acting as a scribe for Dr. Christiano Pérez on (05/24/2022).\par \par All medical record record entries made by the Scribe were at my, Dr. Christiano Pérez's, direction and personally dictated by me on (05/24/2022). I have reviewed the chart and agree that the record accurately reflects my personal performance of the history, physical exam, assessment and plan. I have personally directed, reviewed, and agree with the discharge instructions.

## 2022-05-24 NOTE — PHYSICAL EXAM
[No Acute Distress] : no acute distress [Normal Oropharynx] : normal oropharynx [Normal Appearance] : normal appearance [No Neck Mass] : no neck mass [Normal Rate/Rhythm] : normal rate/rhythm [Normal S1, S2] : normal s1, s2 [No Murmurs] : no murmurs [No Resp Distress] : no resp distress [Clear to Auscultation Bilaterally] : clear to auscultation bilaterally [No Abnormalities] : no abnormalities [Benign] : benign [Normal Gait] : normal gait [No Clubbing] : no clubbing [No Cyanosis] : no cyanosis [FROM] : FROM [Normal Color/ Pigmentation] : normal color/ pigmentation [No Focal Deficits] : no focal deficits [Oriented x3] : oriented x3 [Normal Affect] : normal affect [III] : Mallampati Class: III [TextBox_2] : OW , wheelchair  [TextBox_68] : I:E 1:3, clear [TextBox_105] : 2+ LE edema

## 2022-05-24 NOTE — ASSESSMENT
[FreeTextEntry1] : Mr. CODY is a 67 year old male with a history of ALS, HTN, asthma, OW, arthritis, Kidney stones, Skin CA (melanoma), GERD, Stomach Ulcers present in the office for an follow up pulmonary evaluation. #1 Chronic Cough, #2 SOB-Asthma, restrictive dysfunction, LAQUITA weakness. (+)ARELY severe - more compliant with astral device- dry mouth #2\par \par His shortness of breath is multifactorial due to:\par -poor mechanics of breathing \par -out of shape / overweight\par -pulmonary disease\par - Restrictive dysfunction, mild asthma \par -?cardiac disease \par \par His chronic cough is felt to be multifactorial due to:\par -Asthma (Therapy as mentioned) \par -Post nasal drip syndrome/ Allergy \par -GERD / LPR\par \par problem 1: Restrictive Dysfunction (ALS) \par -complete regular PFTs and LQAUITA\par -continue Dr. Taveras Neurologic Evaluations\par -Astral ([facility])\par \par Problem 1A: OSAS (severe)\par -Astral ([facility]) - in place- "mouth kote"\par \par Problem 2: mild Asthma\par -continue Singulair 10 mg QHS \par -NC Trelegy 200 1 inhalation QD \par Asthma is believed to be caused by inherited (genetic) and environmental factor, but its exact cause is unknown. Asthma may be triggered by allergens, lung infections, or irritants in the air. Asthma triggers are different for each person \par -Inhaler technique reviewed as well as oral hygiene techniques reviewed with patient. Avoidance of cold air, extremes of temperature, rescue inhaler should be used before exercise. Order of medication reviewed with patient. Recommended use of a cool mist humidifier in the bedroom. \par \par Problem 3: Allergies\par -continue Ryvent 6mg QHS\par -get Blood work to include: asthma panel, food IgE panel, IgE level, eosinophil level, vitamin D level (NC)\par -continue Olopatadine 0.6% at 1 sniff/nostril BID (restart)\par Environmental measures for allergies were encouraged including mattress and pillow covers, air purifier, and environmental controls.\par \par Problem 3A: Low Vitamin D\par -on Rx\par Has been associated with asthma exacerbations and increased allergic symptoms. The goal based on recent information is maintaining levels between 50-70 and low normal is 30. Recommended 50,000 units every two weeks to once a month depending on the level.\par \par Problem 4: LPR/ Globus/ Reflux\par -continue Pepcid 40 mg QHS \par -Rule of 2s: avoid eating too much, eating too late, eating too spicy, eating two hours before bed.\par -Things to avoid including overeating, spicy foods, tight clothing, eating within three hours of bed, this list is not all inclusive. \par -For treatment of reflux, possible options discussed including diet control, H2 blockers, PPIs, as well as coating motility agents discussed as treatment options. Timing of meals and proximity of last meal to sleep were discussed. If symptoms persist, a formal gastrointestinal evaluation is needed.\par \par Problem 5: (+) ARELY - severe\par -s/p Home Sleep study 10/2021 - Astral in place \par Sleep apnea is associated with adverse clinical consequences which can affect most organ systems. Cardiovascular disease risk includes arrhythmias, atrial fibrillation, hypertension, coronary artery disease, and stroke. Metabolic disorders include diabetes type 2, non-alcoholic fatty liver disease. Mood disorder especially depression; and cognitive decline especially in the elderly. Associations with chronic reflux/Cortes’s esophagus some but not all inclusive. \par -Reasons include arousal consistent with hypopnea; respiratory events most prominent in REM sleep or supine position; therefore sleep staging and body position are important for accurate diagnosis and estimation of AHI. \par \par Treatment options discussed including CPAP/BiPAP machine, oral appliance, ProVent therapy, Oxy-Aid by [employer]pite[employer], new technologies, or positional sleep.Recommended use of the CPAP machine for moderate (AHI >15), moderate to severe (AHI 15-30) and severe patients (AHI > 30). Recommended weight loss which can reduce AHI especially in weight loss of greater than 5% of BMI. Positional sleep is recommended in those with low AHI, low-moderate BMI, and younger age. For severe sleep apnea, the hypoglossal nerve stimulator was recommended as well. \par \par problem 6: cardiac disease\par -recommended to continue to follow up with Cardiologist if needed\par \par problem 7: poor breathing mechanics\par -Recommend "Breather" device\par -Recommended Wim Hof and Buteyko breathing techniques \par -Proper breathing techniques were reviewed with an emphasis of exhalation. Patient instructed to breath in for 1 second and out for four seconds. Patient was encouraged to not talk while walking. \par \par problem 8: out of shape / overweight\par -Recommend "Muniq" OTC Supplement for weight loss, energy levels, and blood sugar levels \par -Weight loss, exercise, and diet control were discussed and are highly encouraged. Treatment options were given such as, aqua therapy, and contacting a nutritionist. Recommended to use the elliptical, stationary bike, less use of treadmill. Mindful eating was explained to the patient Obesity is associated with worsening asthma, shortness of breath, and potential for cardiac disease, diabetes, and other underlying medical conditions\par \par problem 9: health maintenance \par -s/p COVID-19 vaccine x4\par -Recommend Dr. Hodges's Intestinal Formula for constipation\par -recommended yearly flu shot after October 15 2021\par -recommended strep pneumonia vaccines: Prevnar-13 vaccine (3/2020), followed by Pneumo vaccine 23 one year following after 65 (11/2021)\par -recommended early intervention for Upper Respiratory Infections (URIs)\par -recommended regular osteoporosis evaluations\par -recommended early dermatological evaluations\par -recommended after the age of 50 to the age of 70, colonoscopy every 5 years\par \par F/U in 4 months.\par He is encouraged to call with any changes, concerns, or questions\par

## 2022-05-24 NOTE — HISTORY OF PRESENT ILLNESS
[TextBox_4] : Mr. CODY is a 67 year old male presenting to the office today for follow up pulmonary evaluation. His chief complaint is\par - he notes feeling not so poorly\par - he notes only his left arm is working little\par - he notes ankle swelling \par - he notes sleeping is variable \par - he notes using astral but notes dry mouth \par - he notes nose congestion in the middle of the night \par - he denies nose bleeds\par - he notes bowels are regular \par - he denies any visual issues\par - he denies muscle pains\par - he notes eating well\par - he notes weight is stable \par - s/p COVID-19 vax x4\par \par - He denies any headaches, nausea, vomiting, fever, chills, sweats, chest pain, chest pressure, palpitations, SOB, coughing, wheezing, fatigue, diarrhea, constipation, dysphagia, myalgias, dizziness, leg swelling, leg pain, itchy eyes, itchy ears, heartburn, reflux, or sour taste in the mouth

## 2022-05-24 NOTE — PROCEDURE
[FreeTextEntry1] : FENO was 10; a normal value being less than 25\par Fractional exhaled nitric oxide (FENO) is regarded as a simple, noninvasive method for assessing eosinophilic airway inflammation. Produced by a variety of cells within the lung, nitric oxide (NO) concentrations are generally low in healthy individuals. However, high concentrations of NO appear to be involved in nonspecific host defense mechanisms and chronic inflammatory diseases such as asthma. The American Thoracic Society (ATS) therefore has recommended using FENO to aid in the diagnosis and monitoring of eosinophilic airway inflammation and asthma, and for identifying steroid responsive individuals whose chronic respiratory symptoms may be caused by airway inflammation.\par \par  Full PFT- spi reveals moderate restrictive dysfunction; FEV1 was 1.84L which is 46% of predicted; moderate low lung volumes; moderate low diffusion at 17.4, which is 59% of predicted; normal flow volume loop, Moderate sever low LAQUITA, PI max 42% PE max 41%

## 2022-08-08 ENCOUNTER — APPOINTMENT (OUTPATIENT)
Dept: NEUROLOGY | Facility: CLINIC | Age: 67
End: 2022-08-08

## 2022-08-08 VITALS
HEART RATE: 71 BPM | DIASTOLIC BLOOD PRESSURE: 80 MMHG | RESPIRATION RATE: 16 BRPM | WEIGHT: 280 LBS | BODY MASS INDEX: 35.94 KG/M2 | OXYGEN SATURATION: 96 % | HEIGHT: 74 IN | SYSTOLIC BLOOD PRESSURE: 127 MMHG

## 2022-08-08 PROCEDURE — 97165 OT EVAL LOW COMPLEX 30 MIN: CPT | Mod: GO

## 2022-08-08 PROCEDURE — 92610 EVALUATE SWALLOWING FUNCTION: CPT | Mod: GP

## 2022-08-08 PROCEDURE — 97162 PT EVAL MOD COMPLEX 30 MIN: CPT | Mod: GP

## 2022-08-08 PROCEDURE — 99215 OFFICE O/P EST HI 40 MIN: CPT | Mod: 25

## 2022-08-08 NOTE — PHYSICAL EXAM
[Person] : oriented to person [Place] : oriented to place [Time] : oriented to time [Short Term Intact] : short term memory intact [Remote Intact] : remote memory intact [Registration Intact] : recent registration memory intact [Span Intact] : the attention span was normal [Concentration Intact] : normal concentrating ability [Visual Intact] : visual attention was ~T not ~L decreased [Cranial Nerves Optic (II)] : visual acuity intact bilaterally,  visual fields full to confrontation, pupils equal round and reactive to light [Cranial Nerves Oculomotor (III)] : extraocular motion intact [Cranial Nerves Trigeminal (V)] : facial sensation intact symmetrically [Cranial Nerves Vestibulocochlear (VIII)] : hearing was intact bilaterally [Cranial Nerves Glossopharyngeal (IX)] : tongue and palate midline [Cranial Nerves Accessory (XI - Cranial And Spinal)] : head turning and shoulder shrug symmetric [Cranial Nerves Hypoglossal (XII)] : there was no tongue deviation with protrusion [Motor Tone] : muscle tone was normal in all four extremities [Motor Strength] : muscle strength was normal in all four extremities [Motor Strength Upper Extremities Bilaterally] : there was weakness in both upper extremities [Motor Strength Lower Extremities Bilaterally] : there was weakness in both lower extremities [Hand Weakness Right] : the hand  was weak [1] : shoulder external rotation 1/5 [Hand Weakness Left] : the hand  was weak [4] : fingers abduction 4/5 left [2] : thumb extension 2/5 [3] : thumb adduction 3/5 [0] : great toe flexion 0/5 [Sensation Tactile Decrease] : light touch was intact [Sensation Pain / Temperature Decrease] : pain and temperature was intact [Sensation Vibration Decrease] : vibration was intact [3+] : Triceps left 3+ [2+] : Ankle jerk left 2+ [Sclera] : the sclera and conjunctiva were normal [PERRL With Normal Accommodation] : pupils were equal in size, round, reactive to light, with normal accommodation [Extraocular Movements] : extraocular movements were intact [Outer Ear] : the ears and nose were normal in appearance [Oropharynx] : the oropharynx was normal [Plantar Reflex Right Only] : normal on the right [Plantar Reflex Left Only] : normal on the left

## 2022-08-08 NOTE — HISTORY OF PRESENT ILLNESS
[FreeTextEntry1] : Patient presents for f/u evaluation of ALS disease. \par \par Presents with normal speech.\par Voice- lost ability to sing; voice banking;  Regular diet \par Presents with intact swallowing, no difficulty in eating, still able to swallow pills without water.\par \par Presents with reduced fine movements of R hand, particularly unable to bring thumb and 5th digit in opposition. Unable to lift R hand or use it for eating, writing, or handling objects. \par \par Presents confined to wheelchair. Requires assistance with urination. \par Presents with constipation sometimes going 4-5 days without stooling. \par Presents with orthopnea, denies dyspnea at rest. \par \par RUE keeps falling off the hand rest.of the chair. \par R arm and hand swollen and erythematous, thumb opposition and abduction 3/5. finger abduction 3/5,  3/5\par L thumb abduction 3/5, finger abduction 4/5,  4/5\par LE reflexes 3+\par

## 2022-08-08 NOTE — ASSESSMENT
[FreeTextEntry1] : Amyotrophic lateral sclerosis (G 12.21) (335.20)\par \par Evaluated by me today in multidisciplinary ALS clinic; required evaluations today by PT/OT/speech and swallowing therapists.  Social work needs addressed and goals of care reinforced.  End-of-life care including healthcare proxy and patient wishes were discussed.\par \par Nutritional/dietary needs discussed and addressed.\par \par Pulmonary function was assessed by respiratory therapy and spirometry.  Patient's respiratory function is \par \par Saliva management needs assessed none\par \par  PT/OT recommends: home PT/OT home health aid; \par \par  Speech swallow therapy recommends: no intervention\par \par  Continue riluzole 50 mg twice daily \par \par  Refill \par \par  Follow-up in 6 months at ALS clinic \par \par

## 2022-08-24 ENCOUNTER — NON-APPOINTMENT (OUTPATIENT)
Age: 67
End: 2022-08-24

## 2022-10-04 ENCOUNTER — APPOINTMENT (OUTPATIENT)
Dept: PULMONOLOGY | Facility: CLINIC | Age: 67
End: 2022-10-04

## 2022-10-04 VITALS
BODY MASS INDEX: 34.01 KG/M2 | DIASTOLIC BLOOD PRESSURE: 80 MMHG | HEIGHT: 74 IN | SYSTOLIC BLOOD PRESSURE: 140 MMHG | WEIGHT: 265 LBS | OXYGEN SATURATION: 96 % | HEART RATE: 102 BPM | TEMPERATURE: 97.8 F | RESPIRATION RATE: 16 BRPM

## 2022-10-04 PROCEDURE — 94727 GAS DIL/WSHOT DETER LNG VOL: CPT

## 2022-10-04 PROCEDURE — G0008: CPT

## 2022-10-04 PROCEDURE — 94010 BREATHING CAPACITY TEST: CPT

## 2022-10-04 PROCEDURE — ZZZZZ: CPT

## 2022-10-04 PROCEDURE — 99214 OFFICE O/P EST MOD 30 MIN: CPT | Mod: 25

## 2022-10-04 PROCEDURE — 90662 IIV NO PRSV INCREASED AG IM: CPT

## 2022-10-04 PROCEDURE — 94729 DIFFUSING CAPACITY: CPT

## 2022-10-04 NOTE — HISTORY OF PRESENT ILLNESS
[TextBox_4] : Mr. CODY is a 67 year old male presenting to the office today for follow up pulmonary evaluation. His chief complaint is\par -his family members notes he complains of a constant cough, PND, needing to clear mucus from PND\par -he notes that his cough does not feel that it is coming from his lungs\par -he notes that when he lies down flat, the cough resolves\par -he notes that his cough comes from the upper part of the bronchiole tubes\par -he notes diarrhea and constipation\par -he notes reflux which is worse recently\par -he notes that he has lost weight \par -he notes talking exacerbates his cough\par -he notes that his energy levels are not bad\par -no new medications, vitamins, or supplements \par -he notes his dry mouth has improved since he changed masks\par -he notes that he has not been taking famotidine \par \par \par -patient denies any headaches, nausea, vomiting, fever, chills, sweats, chest pain, chest pressure, palpitations, wheezing, fatigue, dysphagia, myalgias, dizziness, leg swelling, leg pain, itchy eyes, itchy ears, heartburn, or sour taste in the mouth

## 2022-10-04 NOTE — PROCEDURE
[FreeTextEntry1] : Full PFT revealed severe restrictive dysfunction, with a FEV1 of 1.38L, which is 34% of predicted, normal lung volumes, and a mildly decreased diffusion of 18.8, which is 65% of predicted, with a normal flow volume loop. Severely decreased LAQUITA. PI/PE Max 26/36?\par \par Feno was unable to be performed; a normal value being less than 25. Fractional exhaled nitric oxide (FENO) is regarded as a simple, noninvasive method for assessing eosinophilic airway inflammation. Produced by a variety of cells within the lung, nitric oxide (NO) concentrations are generally low in healthy individuals. However, high concentrations of NO appear to be involved in nonspecific host defense mechanisms and chronic inflammatory  diseases such as asthma. The American Thoracic Society (ATS) therefore recommended using FENO to aid in the diagnosis and monitoring of eosinophilic airway inflammation and asthma, and for identifying steroid responsive individuals whose chronic respiratory symptoms may be caused by airway inflammation

## 2022-10-04 NOTE — PHYSICAL EXAM
[No Acute Distress] : no acute distress [Normal Oropharynx] : normal oropharynx [III] : Mallampati Class: III [Normal Appearance] : normal appearance [No Neck Mass] : no neck mass [Normal Rate/Rhythm] : normal rate/rhythm [Normal S1, S2] : normal s1, s2 [No Murmurs] : no murmurs [No Resp Distress] : no resp distress [Clear to Auscultation Bilaterally] : clear to auscultation bilaterally [No Abnormalities] : no abnormalities [Benign] : benign [Normal Gait] : normal gait [No Clubbing] : no clubbing [No Cyanosis] : no cyanosis [FROM] : FROM [Normal Color/ Pigmentation] : normal color/ pigmentation [No Focal Deficits] : no focal deficits [Oriented x3] : oriented x3 [Normal Affect] : normal affect [TextBox_2] : OW , wheelchair  [TextBox_68] : I:E 1:3, clear [TextBox_105] : 1+ LE edema

## 2022-10-04 NOTE — ASSESSMENT
[FreeTextEntry1] : Mr. CODY is a 67 year old male with a history of ALS, HTN, asthma, OW, arthritis, Kidney stones, Skin CA (melanoma), GERD, Stomach Ulcers present in the office for an follow up pulmonary evaluation. #1 Chronic Cough, #2 SOB-Asthma, restrictive dysfunction, LAQUITA weakness. (+)ARELY severe - more compliant with astral device- LPR/ RADS/ PNDz\par \par His shortness of breath is multifactorial due to:\par -poor mechanics of breathing \par -out of shape / overweight\par -pulmonary disease\par - Restrictive dysfunction, mild asthma \par -?cardiac disease \par \par His chronic cough is felt to be multifactorial due to:\par -Asthma (Therapy as mentioned) \par -Post nasal drip syndrome/ Allergy \par -GERD / LPR\par \par problem 1: Restrictive Dysfunction (ALS) \par -complete regular PFTs and LAQUITA\par -continue Dr. Taveras Neurologic Evaluations\par -Astral ([facility])\par \par Problem 1A: OSAS (severe) - improve with new mask\par -Astral ([facility]) - in place- "mouth kote"\par -Sleep apnea is associated with adverse clinical consequences which can affect most organ systems. Cardiovascular disease risk includes arrhythmias, atrial fibrillation, hypertension, coronary artery disease, and stroke. Metabolic disorders include diabetes type 2, non-alcoholic fatty liver disease. Mood disorder especially depression; and cognitive decline especially in the elderly. Associations with chronic reflux/Cortes’s esophagus some but not all inclusive. \par -Reasons include arousal consistent with hypopnea; respiratory events most prominent in REM sleep or supine position; therefore sleep staging and body position are important for accurate diagnosis and estimation of AHI. \par \par Problem 2: mild Asthma\par -continue Singulair 10 mg QHS \par -NC Trelegy 200 1 inhalation QD \par -add Performist 1 unit dose via nebulizer, BID \par -Add budesonide 0.5 BID via nebulizer\par Asthma is believed to be caused by inherited (genetic) and environmental factor, but its exact cause is unknown. Asthma may be triggered by allergens, lung infections, or irritants in the air. Asthma triggers are different for each person \par -Inhaler technique reviewed as well as oral hygiene techniques reviewed with patient. Avoidance of cold air, extremes of temperature, rescue inhaler should be used before exercise. Order of medication reviewed with patient. Recommended use of a cool mist humidifier in the bedroom. \par \par Problem 3: Allergies (active)\par -continue Ryvent 6mg QHS\par -get Blood work to include: asthma panel, food IgE panel, IgE level, eosinophil level, vitamin D level (NC)\par -continue Olopatadine 0.6% at 1 sniff/nostril BID (restart)\par Environmental measures for allergies were encouraged including mattress and pillow covers, air purifier, and environmental controls.\par \par Problem 3A: Low Vitamin D\par -on Rx\par Has been associated with asthma exacerbations and increased allergic symptoms. The goal based on recent information is maintaining levels between 50-70 and low normal is 30. Recommended 50,000 units every two weeks to once a month depending on the level.\par \par Problem 4: LPR/ Globus/ Reflux\par -add Protonix 40 mg QAM, pre-breakfast \par -continue Pepcid 40 mg QHS \par -Rule of 2s: avoid eating too much, eating too late, eating too spicy, eating two hours before bed.\par -Things to avoid including overeating, spicy foods, tight clothing, eating within three hours of bed, this list is not all inclusive. \par -For treatment of reflux, possible options discussed including diet control, H2 blockers, PPIs, as well as coating motility agents discussed as treatment options. Timing of meals and proximity of last meal to sleep were discussed. If symptoms persist, a formal gastrointestinal evaluation is needed.\par \par Problem 5: Mucopurulent Bronchitis\par -add vest therapy\par \par Problem 5: (+) ARELY - severe\par -s/p Home Sleep study 10/2021 - Astral in place \par Sleep apnea is associated with adverse clinical consequences which can affect most organ systems. Cardiovascular disease risk includes arrhythmias, atrial fibrillation, hypertension, coronary artery disease, and stroke. Metabolic disorders include diabetes type 2, non-alcoholic fatty liver disease. Mood disorder especially depression; and cognitive decline especially in the elderly. Associations with chronic reflux/Cortes’s esophagus some but not all inclusive. \par -Reasons include arousal consistent with hypopnea; respiratory events most prominent in REM sleep or supine position; therefore sleep staging and body position are important for accurate diagnosis and estimation of AHI. \par \par Treatment options discussed including CPAP/BiPAP machine, oral appliance, ProVent therapy, Oxy-Aid by [employer], new technologies, or positional sleep.Recommended use of the CPAP machine for moderate (AHI >15), moderate to severe (AHI 15-30) and severe patients (AHI > 30). Recommended weight loss which can reduce AHI especially in weight loss of greater than 5% of BMI. Positional sleep is recommended in those with low AHI, low-moderate BMI, and younger age. For severe sleep apnea, the hypoglossal nerve stimulator was recommended as well. \par \par problem 6: cardiac disease\par -recommended to continue to follow up with Cardiologist if needed\par \par problem 7: poor breathing mechanics\par -Recommend "Breather" device\par -Recommended Wim Hof and Buteyko breathing techniques \par -Proper breathing techniques were reviewed with an emphasis of exhalation. Patient instructed to breath in for 1 second and out for four seconds. Patient was encouraged to not talk while walking. \par \par problem 8: out of shape / overweight\par -Recommend "Muniq" OTC Supplement for weight loss, energy levels, and blood sugar levels \par -Weight loss, exercise, and diet control were discussed and are highly encouraged. Treatment options were given such as, aqua therapy, and contacting a nutritionist. Recommended to use the elliptical, stationary bike, less use of treadmill. Mindful eating was explained to the patient Obesity is associated with worsening asthma, shortness of breath, and potential for cardiac disease, diabetes, and other underlying medical conditions\par \par problem 9: health maintenance \par -s/p COVID-19 vaccine x4\par -Recommend Dr. Hodges's Intestinal Formula for constipation\par -recommended yearly flu shot after October 15 2021\par -recommended strep pneumonia vaccines: Prevnar-13 vaccine (3/2020), followed by Pneumo vaccine 23 one year following after 65 (11/2021)\par -recommended early intervention for Upper Respiratory Infections (URIs)\par -recommended regular osteoporosis evaluations\par -recommended early dermatological evaluations\par -recommended after the age of 50 to the age of 70, colonoscopy every 5 years\par \par F/U in 4 months.\par He is encouraged to call with any changes, concerns, or questions\par

## 2022-10-04 NOTE — ADDENDUM
[FreeTextEntry1] : Documented by Bradly Sheehan acting as a scribe for Dr. Christiano Pérez on 10/04/2022.\par \par All medical record entries made by the Scribe were at my, Dr. Christiano Pérez's, direction and personally dictated by me on 10/04/2022. I have reviewed the chart and agree that the record accurately reflects my personal performance of the history, physical exam, assessment and plan. I have also personally directed, reviewed, and agree with the discharge instructions.

## 2022-10-27 ENCOUNTER — NON-APPOINTMENT (OUTPATIENT)
Age: 67
End: 2022-10-27

## 2022-10-28 ENCOUNTER — NON-APPOINTMENT (OUTPATIENT)
Age: 67
End: 2022-10-28

## 2022-11-08 ENCOUNTER — APPOINTMENT (OUTPATIENT)
Dept: INTERNAL MEDICINE | Facility: CLINIC | Age: 67
End: 2022-11-08

## 2022-11-08 VITALS — HEART RATE: 100 BPM | DIASTOLIC BLOOD PRESSURE: 80 MMHG | SYSTOLIC BLOOD PRESSURE: 130 MMHG | OXYGEN SATURATION: 97 %

## 2022-11-08 DIAGNOSIS — I10 ESSENTIAL (PRIMARY) HYPERTENSION: ICD-10-CM

## 2022-11-08 DIAGNOSIS — R35.0 FREQUENCY OF MICTURITION: ICD-10-CM

## 2022-11-08 DIAGNOSIS — Z00.00 ENCOUNTER FOR GENERAL ADULT MEDICAL EXAMINATION W/OUT ABNORMAL FINDINGS: ICD-10-CM

## 2022-11-08 PROCEDURE — G0439: CPT

## 2022-11-08 PROCEDURE — G0444 DEPRESSION SCREEN ANNUAL: CPT

## 2022-11-11 ENCOUNTER — NON-APPOINTMENT (OUTPATIENT)
Age: 67
End: 2022-11-11

## 2022-11-14 ENCOUNTER — APPOINTMENT (OUTPATIENT)
Dept: NEUROLOGY | Facility: CLINIC | Age: 67
End: 2022-11-14
Payer: MEDICARE

## 2022-11-14 ENCOUNTER — APPOINTMENT (OUTPATIENT)
Dept: NEUROLOGY | Facility: CLINIC | Age: 67
End: 2022-11-14

## 2022-11-14 VITALS
HEART RATE: 91 BPM | SYSTOLIC BLOOD PRESSURE: 145 MMHG | DIASTOLIC BLOOD PRESSURE: 90 MMHG | WEIGHT: 265 LBS | BODY MASS INDEX: 34.01 KG/M2 | HEIGHT: 74 IN

## 2022-11-14 PROCEDURE — 97165 OT EVAL LOW COMPLEX 30 MIN: CPT | Mod: GO,GP

## 2022-11-14 PROCEDURE — 99215 OFFICE O/P EST HI 40 MIN: CPT | Mod: 25

## 2022-11-14 PROCEDURE — 92610 EVALUATE SWALLOWING FUNCTION: CPT | Mod: GP

## 2022-11-14 PROCEDURE — 97162 PT EVAL MOD COMPLEX 30 MIN: CPT | Mod: GP

## 2022-11-28 PROBLEM — I10 BENIGN ESSENTIAL HYPERTENSION: Status: ACTIVE | Noted: 2020-02-26

## 2022-11-28 LAB
25(OH)D3 SERPL-MCNC: 45 NG/ML
ALBUMIN SERPL ELPH-MCNC: 4 G/DL
ALP BLD-CCNC: 80 U/L
ALT SERPL-CCNC: 21 U/L
ANION GAP SERPL CALC-SCNC: 14 MMOL/L
AST SERPL-CCNC: 17 U/L
BASOPHILS # BLD AUTO: 0.08 K/UL
BASOPHILS NFR BLD AUTO: 0.7 %
BILIRUB SERPL-MCNC: 0.5 MG/DL
BUN SERPL-MCNC: 9 MG/DL
CALCIUM SERPL-MCNC: 9.3 MG/DL
CHLORIDE SERPL-SCNC: 101 MMOL/L
CHOLEST SERPL-MCNC: 231 MG/DL
CO2 SERPL-SCNC: 26 MMOL/L
CREAT SERPL-MCNC: 0.36 MG/DL
EGFR: 123 ML/MIN/1.73M2
EOSINOPHIL # BLD AUTO: 0.14 K/UL
EOSINOPHIL NFR BLD AUTO: 1.3 %
ESTIMATED AVERAGE GLUCOSE: 120 MG/DL
FOLATE SERPL-MCNC: 9.9 NG/ML
GLUCOSE SERPL-MCNC: 106 MG/DL
HBA1C MFR BLD HPLC: 5.8 %
HCT VFR BLD CALC: 46 %
HDLC SERPL-MCNC: 57 MG/DL
HGB BLD-MCNC: 15.2 G/DL
IMM GRANULOCYTES NFR BLD AUTO: 0.4 %
LDLC SERPL CALC-MCNC: 158 MG/DL
LYMPHOCYTES # BLD AUTO: 0.91 K/UL
LYMPHOCYTES NFR BLD AUTO: 8.3 %
MAN DIFF?: NORMAL
MCHC RBC-ENTMCNC: 31.1 PG
MCHC RBC-ENTMCNC: 33 GM/DL
MCV RBC AUTO: 94.3 FL
MONOCYTES # BLD AUTO: 0.78 K/UL
MONOCYTES NFR BLD AUTO: 7.1 %
NEUTROPHILS # BLD AUTO: 9.02 K/UL
NEUTROPHILS NFR BLD AUTO: 82.2 %
NONHDLC SERPL-MCNC: 174 MG/DL
PLATELET # BLD AUTO: 281 K/UL
POTASSIUM SERPL-SCNC: 3.9 MMOL/L
PROT SERPL-MCNC: 7 G/DL
RBC # BLD: 4.88 M/UL
RBC # FLD: 13.6 %
SODIUM SERPL-SCNC: 141 MMOL/L
TRIGL SERPL-MCNC: 79 MG/DL
TSH SERPL-ACNC: 1.37 UIU/ML
VIT B12 SERPL-MCNC: >2000 PG/ML
WBC # FLD AUTO: 10.97 K/UL

## 2022-11-28 NOTE — HISTORY OF PRESENT ILLNESS
[de-identified] : 68 yo male with hx of ALS and  progressive weakness in both legs and arms, R>L.. Unable to use right hand for eating, writing .\par Speech normal, but lost abilty ot sing.\par Only able to use left hand\par Swallowing intact, able to eat and swallow pills\par Confined to motorized w/c\par Needs assistance with urination\par Hx constipation\par Follows closely with neurology, on meds listed.\par He is on Radicava and riluzole as per neurology but no significant improvement.\par He is willing to get labs to check cholesterol since he has been mostly homebound with the pandemic and requires a wheelchair to get around.\par \par Received first  Pfizer COVID vaccines in 2/28 with fever to 100 x 5 days, cough slowly got better but then worsened after he received the 2nd Pfizer 3/18. Cough raspy but unable to bring up phlegm. Wife reports he has trouble breathing through his sinuses when he is sleeping, denies any witnessed apneic moments when sleeping/snoring in certain positions\par \par Hx HTN/obesity-here for BP and weight check and labs.\par BP controlled\par

## 2022-11-28 NOTE — PHYSICAL EXAM
[Normal Sclera/Conjunctiva] : normal sclera/conjunctiva [Normal] : normal rate, regular rhythm, normal S1 and S2 and no murmur heard [Soft] : abdomen soft [de-identified] : right hand n fingers swollen but warm with perfuso [de-identified] : see hpi

## 2022-11-28 NOTE — HEALTH RISK ASSESSMENT
[Yes] : Yes [Never] : Never [Monthly or less (1 pt)] : Monthly or less (1 point) [0] : 2) Feeling down, depressed, or hopeless: Not at all (0) [PHQ-2 Positive] : PHQ-2 Positive [PHQ-9 Positive] : PHQ-9 Positive [Audit-CScore] : 1

## 2022-12-02 NOTE — PHYSICAL EXAM
[Person] : oriented to person [Place] : oriented to place [Time] : oriented to time [Short Term Intact] : short term memory intact [Remote Intact] : remote memory intact [Span Intact] : the attention span was normal [Concentration Intact] : normal concentrating ability [Fluency] : fluency intact [Comprehension] : comprehension intact [Cranial Nerves Optic (II)] : visual acuity intact bilaterally,  visual fields full to confrontation, pupils equal round and reactive to light [Cranial Nerves Oculomotor (III)] : extraocular motion intact [Cranial Nerves Trigeminal (V)] : facial sensation intact symmetrically [Cranial Nerves Facial (VII)] : face symmetrical [Cranial Nerves Vestibulocochlear (VIII)] : hearing was intact bilaterally [Cranial Nerves Glossopharyngeal (IX)] : tongue and palate midline [Cranial Nerves Accessory (XI - Cranial And Spinal)] : head turning and shoulder shrug symmetric [Cranial Nerves Hypoglossal (XII)] : there was no tongue deviation with protrusion [Cranial Nerves Facial Peripheral - Right Only] : peripheral 7th nerve weakness [Cranial Nerves Right Facial: House Grade ___(1-6)] : grade V (severe, 20%) facial nerve function [Cranial Nerves Facial Peripheral - Left Only] : peripheral 7th nerve weakness [Cranial Nerves Left Facial: House Grade ___(1-6)] : grade V (severe, 20%) facial nerve function [Motor Handedness Right-Handed] : the patient is right hand dominant [Motor Strength Upper Extremities Bilaterally] : there was weakness in both upper extremities [Motor Strength Lower Extremities Bilaterally] : there was weakness in both lower extremities [2] : fingers flexion 2/5 [1] : knee extension 1/5 [Sensation Tactile Decrease] : light touch was intact [Sensation Pain / Temperature Decrease] : pain and temperature was intact [Vibration Decrease Leg / Foot Both Ankles] : decreased at both ankles [Vibration Decrease Leg / Foot Toes Both Feet] : decreased at the toes of both feet  [Position Sensation Decrease Leg/Foot At Level Of Toes] : impaired at the toes in both legs [Limited Balance] : the patient's balance was impaired [1+] : Triceps right 1+ [0] : Ankle jerk left 0 [Full Pulse] : the pedal pulses are present [Edema] : there was no peripheral edema [Bowel Sounds] : normal bowel sounds [Abdomen Soft] : soft [Abdomen Tenderness] : non-tender [] : no hepato-splenomegaly [Abdomen Mass (___ Cm)] : no abdominal mass palpated [Dysdiadochokinesia Bilaterally] : not present [FreeTextEntry8] : cannot sit stand  [FreeTextEntry1] : significant bilateral pitting edema legs and feet

## 2022-12-02 NOTE — DISCUSSION/SUMMARY
[FreeTextEntry1] : Amyotrophic lateral sclerosis (G 12.21) (335.20)\par ALSFRS 16\par Evaluated by me today in multidisciplinary ALS clinic; required evaluations today by PT/OT/speech and swallowing therapists.  Social work needs addressed and goals of care reinforced.  End-of-life care including healthcare proxy and patient wishes were discussed. Home health aid and PT OT\par \par Nutritional/dietary needs discussed and addressed.\par Concern about swelling of both LE and RUE addressed. Will arrange intermittent pneumatic pumps\par \par Pulmonary function was assessed by respiratory therapy and spirometry.  Patient's respiratory function is \par FVC 32%, he uses the NIV during the day, and at PT he needs to use it more; Recommend a second ventilator. CO2 44, O2SAT 93, HR 82, RR 17, cough flow 230, NIF-55. He uses a cough assist. \par Saliva management needs assessed . Saliva management is stable\par \par  PT/OT recommends:   \par \par  Speech swallow therapy recommends: speech recommends therapy and concern about adequate swallowing\par \par  Continue riluzole 50 mg twice daily \par \par  Refill \par \par  Follow-up in 6 months at ALS clinic \par \par

## 2022-12-16 ENCOUNTER — NON-APPOINTMENT (OUTPATIENT)
Age: 67
End: 2022-12-16

## 2022-12-19 RX ORDER — BACLOFEN 10 MG/1
10 TABLET ORAL
Qty: 270 | Refills: 5 | Status: DISCONTINUED | COMMUNITY
Start: 2021-01-11 | End: 2022-12-19

## 2023-01-01 ENCOUNTER — APPOINTMENT (OUTPATIENT)
Dept: HOME HEALTH SERVICES | Facility: HOME HEALTH | Age: 68
End: 2023-01-01

## 2023-01-01 ENCOUNTER — RX RENEWAL (OUTPATIENT)
Age: 68
End: 2023-01-01

## 2023-01-01 ENCOUNTER — NON-APPOINTMENT (OUTPATIENT)
Age: 68
End: 2023-01-01

## 2023-01-01 ENCOUNTER — OUTPATIENT (OUTPATIENT)
Dept: OUTPATIENT SERVICES | Facility: HOSPITAL | Age: 68
LOS: 1 days | End: 2023-01-01
Payer: MEDICARE

## 2023-01-01 ENCOUNTER — APPOINTMENT (OUTPATIENT)
Dept: NEUROLOGY | Facility: CLINIC | Age: 68
End: 2023-01-01
Payer: MEDICARE

## 2023-01-01 ENCOUNTER — APPOINTMENT (OUTPATIENT)
Dept: HOME HEALTH SERVICES | Facility: HOME HEALTH | Age: 68
End: 2023-01-01
Payer: MEDICARE

## 2023-01-01 ENCOUNTER — APPOINTMENT (OUTPATIENT)
Dept: CT IMAGING | Facility: CLINIC | Age: 68
End: 2023-01-01
Payer: MEDICARE

## 2023-01-01 ENCOUNTER — TRANSCRIPTION ENCOUNTER (OUTPATIENT)
Age: 68
End: 2023-01-01

## 2023-01-01 ENCOUNTER — APPOINTMENT (OUTPATIENT)
Dept: PULMONOLOGY | Facility: CLINIC | Age: 68
End: 2023-01-01
Payer: MEDICARE

## 2023-01-01 ENCOUNTER — FORM ENCOUNTER (OUTPATIENT)
Age: 68
End: 2023-01-01

## 2023-01-01 ENCOUNTER — LABORATORY RESULT (OUTPATIENT)
Age: 68
End: 2023-01-01

## 2023-01-01 ENCOUNTER — APPOINTMENT (OUTPATIENT)
Dept: OTOLARYNGOLOGY | Facility: CLINIC | Age: 68
End: 2023-01-01
Payer: MEDICARE

## 2023-01-01 ENCOUNTER — APPOINTMENT (OUTPATIENT)
Dept: PULMONOLOGY | Facility: CLINIC | Age: 68
End: 2023-01-01

## 2023-01-01 ENCOUNTER — APPOINTMENT (OUTPATIENT)
Dept: RADIOLOGY | Facility: HOSPITAL | Age: 68
End: 2023-01-01
Payer: MEDICARE

## 2023-01-01 ENCOUNTER — APPOINTMENT (OUTPATIENT)
Dept: SPEECH THERAPY | Facility: HOSPITAL | Age: 68
End: 2023-01-01

## 2023-01-01 ENCOUNTER — APPOINTMENT (OUTPATIENT)
Dept: INTERVENTIONAL RADIOLOGY/VASCULAR | Facility: CLINIC | Age: 68
End: 2023-01-01
Payer: MEDICARE

## 2023-01-01 ENCOUNTER — OUTPATIENT (OUTPATIENT)
Dept: OUTPATIENT SERVICES | Facility: HOSPITAL | Age: 68
LOS: 1 days | End: 2023-01-01

## 2023-01-01 ENCOUNTER — OUTPATIENT (OUTPATIENT)
Dept: OUTPATIENT SERVICES | Facility: HOSPITAL | Age: 68
LOS: 1 days | Discharge: HOME | End: 2023-01-01

## 2023-01-01 ENCOUNTER — APPOINTMENT (OUTPATIENT)
Dept: CT IMAGING | Facility: CLINIC | Age: 68
End: 2023-01-01

## 2023-01-01 ENCOUNTER — RESULT REVIEW (OUTPATIENT)
Age: 68
End: 2023-01-01

## 2023-01-01 ENCOUNTER — APPOINTMENT (OUTPATIENT)
Dept: INTERVENTIONAL RADIOLOGY/VASCULAR | Facility: CLINIC | Age: 68
End: 2023-01-01

## 2023-01-01 ENCOUNTER — INPATIENT (INPATIENT)
Facility: HOSPITAL | Age: 68
LOS: 0 days | Discharge: HOME HEALTH | DRG: 56 | End: 2023-05-10
Attending: INTERNAL MEDICINE | Admitting: RADIOLOGY
Payer: COMMERCIAL

## 2023-01-01 VITALS
RESPIRATION RATE: 16 BRPM | TEMPERATURE: 97.7 F | HEART RATE: 91 BPM | SYSTOLIC BLOOD PRESSURE: 124 MMHG | DIASTOLIC BLOOD PRESSURE: 72 MMHG | HEIGHT: 74 IN | OXYGEN SATURATION: 95 % | BODY MASS INDEX: 31.44 KG/M2 | WEIGHT: 245 LBS

## 2023-01-01 VITALS
OXYGEN SATURATION: 98 % | HEIGHT: 74 IN | DIASTOLIC BLOOD PRESSURE: 86 MMHG | HEART RATE: 67 BPM | BODY MASS INDEX: 31.44 KG/M2 | WEIGHT: 245 LBS | SYSTOLIC BLOOD PRESSURE: 143 MMHG

## 2023-01-01 VITALS
HEART RATE: 80 BPM | RESPIRATION RATE: 16 BRPM | DIASTOLIC BLOOD PRESSURE: 84 MMHG | OXYGEN SATURATION: 94 % | TEMPERATURE: 97.8 F | SYSTOLIC BLOOD PRESSURE: 150 MMHG

## 2023-01-01 VITALS
TEMPERATURE: 97.9 F | HEIGHT: 74 IN | BODY MASS INDEX: 35.29 KG/M2 | WEIGHT: 275 LBS | OXYGEN SATURATION: 96 % | DIASTOLIC BLOOD PRESSURE: 80 MMHG | SYSTOLIC BLOOD PRESSURE: 138 MMHG | HEART RATE: 99 BPM | RESPIRATION RATE: 16 BRPM

## 2023-01-01 VITALS
DIASTOLIC BLOOD PRESSURE: 60 MMHG | HEART RATE: 83 BPM | TEMPERATURE: 98.2 F | OXYGEN SATURATION: 91 % | SYSTOLIC BLOOD PRESSURE: 118 MMHG

## 2023-01-01 VITALS
RESPIRATION RATE: 18 BRPM | DIASTOLIC BLOOD PRESSURE: 84 MMHG | TEMPERATURE: 98 F | OXYGEN SATURATION: 97 % | HEART RATE: 100 BPM | SYSTOLIC BLOOD PRESSURE: 131 MMHG | HEIGHT: 74 IN

## 2023-01-01 VITALS
SYSTOLIC BLOOD PRESSURE: 120 MMHG | DIASTOLIC BLOOD PRESSURE: 70 MMHG | OXYGEN SATURATION: 94 % | HEART RATE: 73 BPM | TEMPERATURE: 97.9 F

## 2023-01-01 VITALS
HEART RATE: 72 BPM | SYSTOLIC BLOOD PRESSURE: 120 MMHG | DIASTOLIC BLOOD PRESSURE: 70 MMHG | TEMPERATURE: 98 F | OXYGEN SATURATION: 96 %

## 2023-01-01 VITALS
OXYGEN SATURATION: 96 % | TEMPERATURE: 98.6 F | SYSTOLIC BLOOD PRESSURE: 130 MMHG | DIASTOLIC BLOOD PRESSURE: 80 MMHG | HEART RATE: 80 BPM

## 2023-01-01 VITALS
SYSTOLIC BLOOD PRESSURE: 116 MMHG | BODY MASS INDEX: 31.44 KG/M2 | HEIGHT: 74 IN | WEIGHT: 245 LBS | HEART RATE: 76 BPM | DIASTOLIC BLOOD PRESSURE: 75 MMHG

## 2023-01-01 VITALS
TEMPERATURE: 99 F | RESPIRATION RATE: 20 BRPM | HEART RATE: 70 BPM | DIASTOLIC BLOOD PRESSURE: 92 MMHG | OXYGEN SATURATION: 97 % | SYSTOLIC BLOOD PRESSURE: 149 MMHG

## 2023-01-01 VITALS
OXYGEN SATURATION: 95 % | HEART RATE: 72 BPM | TEMPERATURE: 97.8 F | SYSTOLIC BLOOD PRESSURE: 124 MMHG | DIASTOLIC BLOOD PRESSURE: 70 MMHG

## 2023-01-01 VITALS
HEART RATE: 71 BPM | OXYGEN SATURATION: 97 % | TEMPERATURE: 97.9 F | DIASTOLIC BLOOD PRESSURE: 90 MMHG | SYSTOLIC BLOOD PRESSURE: 148 MMHG

## 2023-01-01 VITALS
TEMPERATURE: 98 F | SYSTOLIC BLOOD PRESSURE: 120 MMHG | DIASTOLIC BLOOD PRESSURE: 70 MMHG | OXYGEN SATURATION: 96 % | HEART RATE: 80 BPM

## 2023-01-01 VITALS
SYSTOLIC BLOOD PRESSURE: 160 MMHG | TEMPERATURE: 97.7 F | OXYGEN SATURATION: 96 % | DIASTOLIC BLOOD PRESSURE: 90 MMHG | HEART RATE: 76 BPM

## 2023-01-01 VITALS
WEIGHT: 275 LBS | DIASTOLIC BLOOD PRESSURE: 85 MMHG | BODY MASS INDEX: 35.29 KG/M2 | HEIGHT: 74 IN | HEART RATE: 97 BPM | SYSTOLIC BLOOD PRESSURE: 144 MMHG

## 2023-01-01 VITALS — OXYGEN SATURATION: 96 % | HEART RATE: 93 BPM

## 2023-01-01 DIAGNOSIS — Z78.9 OTHER SPECIFIED HEALTH STATUS: ICD-10-CM

## 2023-01-01 DIAGNOSIS — J41.1 MUCOPURULENT CHRONIC BRONCHITIS: ICD-10-CM

## 2023-01-01 DIAGNOSIS — G47.33 OBSTRUCTIVE SLEEP APNEA (ADULT) (PEDIATRIC): ICD-10-CM

## 2023-01-01 DIAGNOSIS — R06.83 SNORING: ICD-10-CM

## 2023-01-01 DIAGNOSIS — Z90.49 ACQUIRED ABSENCE OF OTHER SPECIFIED PARTS OF DIGESTIVE TRACT: Chronic | ICD-10-CM

## 2023-01-01 DIAGNOSIS — J96.10 CHRONIC RESPIRATORY FAILURE, UNSPECIFIED WHETHER WITH HYPOXIA OR HYPERCAPNIA: ICD-10-CM

## 2023-01-01 DIAGNOSIS — J96.01 ACUTE RESPIRATORY FAILURE WITH HYPOXIA: ICD-10-CM

## 2023-01-01 DIAGNOSIS — Z98.890 OTHER SPECIFIED POSTPROCEDURAL STATES: Chronic | ICD-10-CM

## 2023-01-01 DIAGNOSIS — R68.89 OTHER GENERAL SYMPTOMS AND SIGNS: ICD-10-CM

## 2023-01-01 DIAGNOSIS — J45.909 UNSPECIFIED ASTHMA, UNCOMPLICATED: ICD-10-CM

## 2023-01-01 DIAGNOSIS — R10.9 UNSPECIFIED ABDOMINAL PAIN: ICD-10-CM

## 2023-01-01 DIAGNOSIS — Z71.89 OTHER SPECIFIED COUNSELING: ICD-10-CM

## 2023-01-01 DIAGNOSIS — R13.19 OTHER DYSPHAGIA: ICD-10-CM

## 2023-01-01 DIAGNOSIS — K59.09 OTHER CONSTIPATION: ICD-10-CM

## 2023-01-01 DIAGNOSIS — G12.21 AMYOTROPHIC LATERAL SCLEROSIS: ICD-10-CM

## 2023-01-01 DIAGNOSIS — Z23 ENCOUNTER FOR IMMUNIZATION: ICD-10-CM

## 2023-01-01 DIAGNOSIS — Z01.818 ENCOUNTER FOR OTHER PREPROCEDURAL EXAMINATION: ICD-10-CM

## 2023-01-01 DIAGNOSIS — K21.9 GASTRO-ESOPHAGEAL REFLUX DISEASE W/OUT ESOPHAGITIS: ICD-10-CM

## 2023-01-01 DIAGNOSIS — G89.18 OTHER ACUTE POSTPROCEDURAL PAIN: ICD-10-CM

## 2023-01-01 DIAGNOSIS — R94.2 ABNORMAL RESULTS OF PULMONARY FUNCTION STUDIES: ICD-10-CM

## 2023-01-01 DIAGNOSIS — F41.9 ANXIETY DISORDER, UNSPECIFIED: ICD-10-CM

## 2023-01-01 DIAGNOSIS — U07.1 COVID-19: ICD-10-CM

## 2023-01-01 DIAGNOSIS — I10 ESSENTIAL (PRIMARY) HYPERTENSION: ICD-10-CM

## 2023-01-01 DIAGNOSIS — R13.10 DYSPHAGIA, UNSPECIFIED: ICD-10-CM

## 2023-01-01 DIAGNOSIS — R13.12 DYSPHAGIA, OROPHARYNGEAL PHASE: ICD-10-CM

## 2023-01-01 DIAGNOSIS — Z29.9 ENCOUNTER FOR PROPHYLACTIC MEASURES, UNSPECIFIED: ICD-10-CM

## 2023-01-01 DIAGNOSIS — Z93.1 GASTROSTOMY STATUS: ICD-10-CM

## 2023-01-01 DIAGNOSIS — J30.9 ALLERGIC RHINITIS, UNSPECIFIED: ICD-10-CM

## 2023-01-01 DIAGNOSIS — R21 RASH AND OTHER NONSPECIFIC SKIN ERUPTION: ICD-10-CM

## 2023-01-01 DIAGNOSIS — R06.02 SHORTNESS OF BREATH: ICD-10-CM

## 2023-01-01 DIAGNOSIS — T14.8XXA OTHER INJURY OF UNSPECIFIED BODY REGION, INITIAL ENCOUNTER: ICD-10-CM

## 2023-01-01 DIAGNOSIS — Z51.5 ENCOUNTER FOR PALLIATIVE CARE: ICD-10-CM

## 2023-01-01 DIAGNOSIS — Z87.09 PERSONAL HISTORY OF OTHER DISEASES OF THE RESPIRATORY SYSTEM: ICD-10-CM

## 2023-01-01 DIAGNOSIS — K21.9 GASTRO-ESOPHAGEAL REFLUX DISEASE WITHOUT ESOPHAGITIS: ICD-10-CM

## 2023-01-01 DIAGNOSIS — E66.9 OBESITY, UNSPECIFIED: ICD-10-CM

## 2023-01-01 DIAGNOSIS — R06.00 DYSPNEA, UNSPECIFIED: ICD-10-CM

## 2023-01-01 LAB
A1C WITH ESTIMATED AVERAGE GLUCOSE RESULT: 6 % — HIGH (ref 4–5.6)
ALBUMIN SERPL ELPH-MCNC: 3.5 G/DL — SIGNIFICANT CHANGE UP (ref 3.3–5)
ALP SERPL-CCNC: 54 U/L — SIGNIFICANT CHANGE UP (ref 40–120)
ALT FLD-CCNC: 15 U/L — SIGNIFICANT CHANGE UP (ref 10–45)
ANION GAP SERPL CALC-SCNC: 11 MMOL/L — SIGNIFICANT CHANGE UP (ref 5–17)
ANION GAP SERPL CALC-SCNC: 13 MMOL/L — SIGNIFICANT CHANGE UP (ref 5–17)
APTT BLD: 31.8 SEC — SIGNIFICANT CHANGE UP (ref 27.5–35.5)
AST SERPL-CCNC: 14 U/L — SIGNIFICANT CHANGE UP (ref 10–40)
BASOPHILS # BLD AUTO: 0.05 K/UL — SIGNIFICANT CHANGE UP (ref 0–0.2)
BASOPHILS NFR BLD AUTO: 0.7 % — SIGNIFICANT CHANGE UP (ref 0–2)
BILIRUB SERPL-MCNC: 0.4 MG/DL — SIGNIFICANT CHANGE UP (ref 0.2–1.2)
BUN SERPL-MCNC: 10 MG/DL — SIGNIFICANT CHANGE UP (ref 7–23)
BUN SERPL-MCNC: 9 MG/DL — SIGNIFICANT CHANGE UP (ref 7–23)
CALCIUM SERPL-MCNC: 8.6 MG/DL — SIGNIFICANT CHANGE UP (ref 8.4–10.5)
CALCIUM SERPL-MCNC: 9.5 MG/DL — SIGNIFICANT CHANGE UP (ref 8.4–10.5)
CHLORIDE SERPL-SCNC: 103 MMOL/L — SIGNIFICANT CHANGE UP (ref 96–108)
CHLORIDE SERPL-SCNC: 104 MMOL/L — SIGNIFICANT CHANGE UP (ref 96–108)
CO2 SERPL-SCNC: 24 MMOL/L — SIGNIFICANT CHANGE UP (ref 22–31)
CO2 SERPL-SCNC: 26 MMOL/L — SIGNIFICANT CHANGE UP (ref 22–31)
CREAT SERPL-MCNC: 0.28 MG/DL — LOW (ref 0.5–1.3)
CREAT SERPL-MCNC: 0.38 MG/DL — LOW (ref 0.5–1.3)
EGFR: 121 ML/MIN/1.73M2 — SIGNIFICANT CHANGE UP
EGFR: 132 ML/MIN/1.73M2 — SIGNIFICANT CHANGE UP
EOSINOPHIL # BLD AUTO: 0.13 K/UL — SIGNIFICANT CHANGE UP (ref 0–0.5)
EOSINOPHIL NFR BLD AUTO: 1.7 % — SIGNIFICANT CHANGE UP (ref 0–6)
ESTIMATED AVERAGE GLUCOSE: 126 MG/DL — HIGH (ref 68–114)
GLUCOSE BLDC GLUCOMTR-MCNC: 108 MG/DL — HIGH (ref 70–99)
GLUCOSE BLDC GLUCOMTR-MCNC: 115 MG/DL — HIGH (ref 70–99)
GLUCOSE BLDC GLUCOMTR-MCNC: 119 MG/DL — HIGH (ref 70–99)
GLUCOSE BLDC GLUCOMTR-MCNC: 156 MG/DL — HIGH (ref 70–99)
GLUCOSE SERPL-MCNC: 111 MG/DL — HIGH (ref 70–99)
GLUCOSE SERPL-MCNC: 118 MG/DL — HIGH (ref 70–99)
GLUCOSE SERPL-MCNC: 154 MG/DL — HIGH (ref 70–99)
HCT VFR BLD CALC: 42.4 % — SIGNIFICANT CHANGE UP (ref 39–50)
HCT VFR BLD CALC: 48 % — SIGNIFICANT CHANGE UP (ref 39–50)
HCV AB S/CO SERPL IA: 0.09 S/CO — SIGNIFICANT CHANGE UP (ref 0–0.99)
HCV AB SERPL-IMP: SIGNIFICANT CHANGE UP
HGB BLD-MCNC: 13.4 G/DL — SIGNIFICANT CHANGE UP (ref 13–17)
HGB BLD-MCNC: 15.4 G/DL — SIGNIFICANT CHANGE UP (ref 13–17)
IMM GRANULOCYTES NFR BLD AUTO: 0.3 % — SIGNIFICANT CHANGE UP (ref 0–0.9)
INR BLD: 1.48 RATIO — HIGH (ref 0.88–1.16)
LYMPHOCYTES # BLD AUTO: 1.37 K/UL — SIGNIFICANT CHANGE UP (ref 1–3.3)
LYMPHOCYTES # BLD AUTO: 17.9 % — SIGNIFICANT CHANGE UP (ref 13–44)
MAGNESIUM SERPL-MCNC: 2 MG/DL — SIGNIFICANT CHANGE UP (ref 1.6–2.6)
MCHC RBC-ENTMCNC: 30.3 PG — SIGNIFICANT CHANGE UP (ref 27–34)
MCHC RBC-ENTMCNC: 31.2 PG — SIGNIFICANT CHANGE UP (ref 27–34)
MCHC RBC-ENTMCNC: 31.6 GM/DL — LOW (ref 32–36)
MCHC RBC-ENTMCNC: 32.1 GM/DL — SIGNIFICANT CHANGE UP (ref 32–36)
MCV RBC AUTO: 95.9 FL — SIGNIFICANT CHANGE UP (ref 80–100)
MCV RBC AUTO: 97.2 FL — SIGNIFICANT CHANGE UP (ref 80–100)
MONOCYTES # BLD AUTO: 0.72 K/UL — SIGNIFICANT CHANGE UP (ref 0–0.9)
MONOCYTES NFR BLD AUTO: 9.4 % — SIGNIFICANT CHANGE UP (ref 2–14)
NEUTROPHILS # BLD AUTO: 5.36 K/UL — SIGNIFICANT CHANGE UP (ref 1.8–7.4)
NEUTROPHILS NFR BLD AUTO: 70 % — SIGNIFICANT CHANGE UP (ref 43–77)
NRBC # BLD: 0 /100 WBCS — SIGNIFICANT CHANGE UP (ref 0–0)
NRBC # BLD: 0 /100 WBCS — SIGNIFICANT CHANGE UP (ref 0–0)
PHOSPHATE SERPL-MCNC: 2.8 MG/DL — SIGNIFICANT CHANGE UP (ref 2.5–4.5)
PLATELET # BLD AUTO: 293 K/UL — SIGNIFICANT CHANGE UP (ref 150–400)
PLATELET # BLD AUTO: 297 K/UL — SIGNIFICANT CHANGE UP (ref 150–400)
POTASSIUM SERPL-MCNC: 3.6 MMOL/L — SIGNIFICANT CHANGE UP (ref 3.5–5.3)
POTASSIUM SERPL-MCNC: 3.9 MMOL/L — SIGNIFICANT CHANGE UP (ref 3.5–5.3)
POTASSIUM SERPL-SCNC: 3.6 MMOL/L — SIGNIFICANT CHANGE UP (ref 3.5–5.3)
POTASSIUM SERPL-SCNC: 3.9 MMOL/L — SIGNIFICANT CHANGE UP (ref 3.5–5.3)
PROT SERPL-MCNC: 6 G/DL — SIGNIFICANT CHANGE UP (ref 6–8.3)
PROTHROM AB SERPL-ACNC: 17.2 SEC — HIGH (ref 10.5–13.4)
RBC # BLD: 4.42 M/UL — SIGNIFICANT CHANGE UP (ref 4.2–5.8)
RBC # BLD: 4.94 M/UL — SIGNIFICANT CHANGE UP (ref 4.2–5.8)
RBC # FLD: 13.1 % — SIGNIFICANT CHANGE UP (ref 10.3–14.5)
RBC # FLD: 13.2 % — SIGNIFICANT CHANGE UP (ref 10.3–14.5)
SODIUM SERPL-SCNC: 140 MMOL/L — SIGNIFICANT CHANGE UP (ref 135–145)
SODIUM SERPL-SCNC: 141 MMOL/L — SIGNIFICANT CHANGE UP (ref 135–145)
WBC # BLD: 10.35 K/UL — SIGNIFICANT CHANGE UP (ref 3.8–10.5)
WBC # BLD: 7.65 K/UL — SIGNIFICANT CHANGE UP (ref 3.8–10.5)
WBC # FLD AUTO: 10.35 K/UL — SIGNIFICANT CHANGE UP (ref 3.8–10.5)
WBC # FLD AUTO: 7.65 K/UL — SIGNIFICANT CHANGE UP (ref 3.8–10.5)

## 2023-01-01 PROCEDURE — 84100 ASSAY OF PHOSPHORUS: CPT

## 2023-01-01 PROCEDURE — 99202 OFFICE O/P NEW SF 15 MIN: CPT | Mod: 95

## 2023-01-01 PROCEDURE — 92607 EX FOR SPEECH DEVICE RX 1HR: CPT | Mod: GN

## 2023-01-01 PROCEDURE — 99214 OFFICE O/P EST MOD 30 MIN: CPT | Mod: CS,95

## 2023-01-01 PROCEDURE — 85027 COMPLETE CBC AUTOMATED: CPT

## 2023-01-01 PROCEDURE — 71045 X-RAY EXAM CHEST 1 VIEW: CPT | Mod: 26

## 2023-01-01 PROCEDURE — ZZZZZ: CPT

## 2023-01-01 PROCEDURE — 98967 PH1 ASSMT&MGMT NQHP 11-20: CPT | Mod: NC,CR

## 2023-01-01 PROCEDURE — 80048 BASIC METABOLIC PNL TOTAL CA: CPT

## 2023-01-01 PROCEDURE — 94640 AIRWAY INHALATION TREATMENT: CPT

## 2023-01-01 PROCEDURE — G0463: CPT

## 2023-01-01 PROCEDURE — 74176 CT ABD & PELVIS W/O CONTRAST: CPT | Mod: 26,MH

## 2023-01-01 PROCEDURE — 86803 HEPATITIS C AB TEST: CPT

## 2023-01-01 PROCEDURE — 99222 1ST HOSP IP/OBS MODERATE 55: CPT

## 2023-01-01 PROCEDURE — 74176 CT ABD & PELVIS W/O CONTRAST: CPT | Mod: 26

## 2023-01-01 PROCEDURE — 49400 AIR INJECTION INTO ABDOMEN: CPT

## 2023-01-01 PROCEDURE — 83036 HEMOGLOBIN GLYCOSYLATED A1C: CPT

## 2023-01-01 PROCEDURE — 92610 EVALUATE SWALLOWING FUNCTION: CPT | Mod: GP,GO

## 2023-01-01 PROCEDURE — C1725: CPT

## 2023-01-01 PROCEDURE — 94660 CPAP INITIATION&MGMT: CPT

## 2023-01-01 PROCEDURE — 82947 ASSAY GLUCOSE BLOOD QUANT: CPT

## 2023-01-01 PROCEDURE — 85610 PROTHROMBIN TIME: CPT

## 2023-01-01 PROCEDURE — 83735 ASSAY OF MAGNESIUM: CPT

## 2023-01-01 PROCEDURE — 99497 ADVNCD CARE PLAN 30 MIN: CPT

## 2023-01-01 PROCEDURE — 94010 BREATHING CAPACITY TEST: CPT

## 2023-01-01 PROCEDURE — 94727 GAS DIL/WSHOT DETER LNG VOL: CPT

## 2023-01-01 PROCEDURE — 85025 COMPLETE CBC W/AUTO DIFF WBC: CPT

## 2023-01-01 PROCEDURE — 99214 OFFICE O/P EST MOD 30 MIN: CPT | Mod: 25

## 2023-01-01 PROCEDURE — 49440 PLACE GASTROSTOMY TUBE PERC: CPT

## 2023-01-01 PROCEDURE — G0506: CPT

## 2023-01-01 PROCEDURE — 97165 OT EVAL LOW COMPLEX 30 MIN: CPT | Mod: GO,GP

## 2023-01-01 PROCEDURE — 90662 IIV NO PRSV INCREASED AG IM: CPT

## 2023-01-01 PROCEDURE — 92523 SPEECH SOUND LANG COMPREHEN: CPT | Mod: GN

## 2023-01-01 PROCEDURE — 92610 EVALUATE SWALLOWING FUNCTION: CPT | Mod: GO,GP

## 2023-01-01 PROCEDURE — C1769: CPT

## 2023-01-01 PROCEDURE — 99345 HOME/RES VST NEW HIGH MDM 75: CPT | Mod: 25

## 2023-01-01 PROCEDURE — 74176 CT ABD & PELVIS W/O CONTRAST: CPT

## 2023-01-01 PROCEDURE — L8699: CPT

## 2023-01-01 PROCEDURE — 99348 HOME/RES VST EST LOW MDM 30: CPT

## 2023-01-01 PROCEDURE — 99348 HOME/RES VST EST LOW MDM 30: CPT | Mod: 95

## 2023-01-01 PROCEDURE — 99495 TRANSJ CARE MGMT MOD F2F 14D: CPT

## 2023-01-01 PROCEDURE — 74230 X-RAY XM SWLNG FUNCJ C+: CPT | Mod: 26

## 2023-01-01 PROCEDURE — 82962 GLUCOSE BLOOD TEST: CPT

## 2023-01-01 PROCEDURE — 71045 X-RAY EXAM CHEST 1 VIEW: CPT

## 2023-01-01 PROCEDURE — 99215 OFFICE O/P EST HI 40 MIN: CPT | Mod: 25

## 2023-01-01 PROCEDURE — 99349 HOME/RES VST EST MOD MDM 40: CPT

## 2023-01-01 PROCEDURE — G0008: CPT | Mod: GV

## 2023-01-01 PROCEDURE — 97166 OT EVAL MOD COMPLEX 45 MIN: CPT | Mod: GO,GP

## 2023-01-01 PROCEDURE — 94729 DIFFUSING CAPACITY: CPT

## 2023-01-01 PROCEDURE — 92610 EVALUATE SWALLOWING FUNCTION: CPT | Mod: GP

## 2023-01-01 PROCEDURE — 80053 COMPREHEN METABOLIC PANEL: CPT

## 2023-01-01 PROCEDURE — 85730 THROMBOPLASTIN TIME PARTIAL: CPT

## 2023-01-01 PROCEDURE — 99239 HOSP IP/OBS DSCHRG MGMT >30: CPT

## 2023-01-01 PROCEDURE — 97162 PT EVAL MOD COMPLEX 30 MIN: CPT | Mod: GO,GP

## 2023-01-01 PROCEDURE — 99214 OFFICE O/P EST MOD 30 MIN: CPT

## 2023-01-01 PROCEDURE — 97165 OT EVAL LOW COMPLEX 30 MIN: CPT | Mod: GO

## 2023-01-01 PROCEDURE — 97162 PT EVAL MOD COMPLEX 30 MIN: CPT | Mod: GP

## 2023-01-01 RX ORDER — BUDESONIDE 0.5 MG/2ML
0.5 INHALANT ORAL TWICE DAILY
Qty: 60 | Refills: 3 | Status: ACTIVE | COMMUNITY
Start: 2022-10-04

## 2023-01-01 RX ORDER — PANTOPRAZOLE SODIUM 20 MG/1
40 TABLET, DELAYED RELEASE ORAL
Refills: 0 | Status: DISCONTINUED | OUTPATIENT
Start: 2023-01-01 | End: 2023-01-01

## 2023-01-01 RX ORDER — PNV NO.95/FERROUS FUM/FOLIC AC 28MG-0.8MG
TABLET ORAL
Refills: 0 | Status: ACTIVE | COMMUNITY

## 2023-01-01 RX ORDER — INSULIN LISPRO 100/ML
VIAL (ML) SUBCUTANEOUS AT BEDTIME
Refills: 0 | Status: DISCONTINUED | OUTPATIENT
Start: 2023-01-01 | End: 2023-01-01

## 2023-01-01 RX ORDER — STARCH
POWDER (GRAM) ORAL
Qty: 1 | Refills: 11 | Status: ACTIVE | COMMUNITY
Start: 2023-01-01 | End: 1900-01-01

## 2023-01-01 RX ORDER — PANTOPRAZOLE SODIUM 20 MG/1
1 TABLET, DELAYED RELEASE ORAL
Refills: 0 | DISCHARGE

## 2023-01-01 RX ORDER — ONDANSETRON 4 MG/1
4 TABLET, ORALLY DISINTEGRATING ORAL EVERY 6 HOURS
Qty: 40 | Refills: 0 | Status: DISCONTINUED | COMMUNITY
Start: 2023-01-01 | End: 2023-01-01

## 2023-01-01 RX ORDER — ONDANSETRON 8 MG/1
4 TABLET, FILM COATED ORAL ONCE
Refills: 0 | Status: DISCONTINUED | OUTPATIENT
Start: 2023-01-01 | End: 2023-01-01

## 2023-01-01 RX ORDER — TOBRAMYCIN 3 MG/ML
0.3 SOLUTION/ DROPS OPHTHALMIC
Qty: 5 | Refills: 0 | Status: DISCONTINUED | COMMUNITY
Start: 2022-11-09 | End: 2023-01-01

## 2023-01-01 RX ORDER — ERGOCALCIFEROL 1.25 MG/1
0 CAPSULE ORAL
Refills: 0 | DISCHARGE

## 2023-01-01 RX ORDER — METOPROLOL TARTRATE 50 MG
25 TABLET ORAL DAILY
Refills: 0 | Status: DISCONTINUED | OUTPATIENT
Start: 2023-01-01 | End: 2023-01-01

## 2023-01-01 RX ORDER — DEXTROSE 50 % IN WATER 50 %
12.5 SYRINGE (ML) INTRAVENOUS ONCE
Refills: 0 | Status: DISCONTINUED | OUTPATIENT
Start: 2023-01-01 | End: 2023-01-01

## 2023-01-01 RX ORDER — OMEPRAZOLE 40 MG/1
40 CAPSULE, DELAYED RELEASE ORAL
Qty: 1 | Refills: 0 | Status: DISCONTINUED | COMMUNITY
Start: 2021-07-02 | End: 2023-01-01

## 2023-01-01 RX ORDER — DILTIAZEM HYDROCHLORIDE 180 MG/1
180 CAPSULE, EXTENDED RELEASE ORAL DAILY
Refills: 0 | Status: ACTIVE | COMMUNITY
Start: 2020-02-26

## 2023-01-01 RX ORDER — FAMOTIDINE 40 MG/1
40 TABLET, FILM COATED ORAL
Qty: 90 | Refills: 1 | Status: DISCONTINUED | COMMUNITY
Start: 2021-04-13 | End: 2023-01-01

## 2023-01-01 RX ORDER — FAMOTIDINE 10 MG/ML
1 INJECTION INTRAVENOUS
Refills: 0 | DISCHARGE

## 2023-01-01 RX ORDER — BETAMETHASONE DIPROPIONATE 0.5 MG/G
0.05 OINTMENT TOPICAL TWICE DAILY
Qty: 1 | Refills: 3 | Status: ACTIVE | COMMUNITY
Start: 2023-01-01

## 2023-01-01 RX ORDER — EDARAVONE 30 MG/100ML
30 INJECTION INTRAVENOUS
Qty: 28 | Refills: 5 | Status: DISCONTINUED | COMMUNITY
Start: 2019-12-30 | End: 2023-01-01

## 2023-01-01 RX ORDER — SODIUM CHLORIDE 9 MG/ML
1000 INJECTION, SOLUTION INTRAVENOUS
Refills: 0 | Status: DISCONTINUED | OUTPATIENT
Start: 2023-01-01 | End: 2023-01-01

## 2023-01-01 RX ORDER — BUDESONIDE, MICRONIZED 100 %
2 POWDER (GRAM) MISCELLANEOUS
Refills: 0 | DISCHARGE

## 2023-01-01 RX ORDER — LEVALBUTEROL HYDROCHLORIDE 0.63 MG/3ML
0.63 SOLUTION RESPIRATORY (INHALATION)
Qty: 1 | Refills: 1 | Status: DISCONTINUED | COMMUNITY
Start: 2021-07-05 | End: 2023-01-01

## 2023-01-01 RX ORDER — ENOXAPARIN SODIUM 100 MG/ML
40 INJECTION SUBCUTANEOUS EVERY 24 HOURS
Refills: 0 | Status: DISCONTINUED | OUTPATIENT
Start: 2023-01-01 | End: 2023-01-01

## 2023-01-01 RX ORDER — ASPIRIN/CALCIUM CARB/MAGNESIUM 324 MG
1 TABLET ORAL
Qty: 0 | Refills: 0 | DISCHARGE
Start: 2023-01-01

## 2023-01-01 RX ORDER — NEOMYCIN AND POLYMYXIN B SULFATES AND DEXAMETHASONE 3.5; 10000; 1 MG/G; [IU]/G; MG/G
3.5-10000-0.1 OINTMENT OPHTHALMIC
Qty: 4 | Refills: 0 | Status: DISCONTINUED | COMMUNITY
Start: 2022-12-07 | End: 2023-01-01

## 2023-01-01 RX ORDER — FLUTICASONE FUROATE, UMECLIDINIUM BROMIDE AND VILANTEROL TRIFENATATE 200; 62.5; 25 UG/1; UG/1; UG/1
200-62.5-25 POWDER RESPIRATORY (INHALATION)
Qty: 3 | Refills: 1 | Status: DISCONTINUED | COMMUNITY
Start: 2021-04-13 | End: 2023-01-01

## 2023-01-01 RX ORDER — ERYTHROMYCIN 5 MG/G
5 OINTMENT OPHTHALMIC
Qty: 1 | Refills: 0 | Status: DISCONTINUED | COMMUNITY
Start: 2020-03-10 | End: 2023-01-01

## 2023-01-01 RX ORDER — SODIUM CHLORIDE 9 MG/ML
1000 INJECTION INTRAMUSCULAR; INTRAVENOUS; SUBCUTANEOUS
Refills: 0 | Status: DISCONTINUED | OUTPATIENT
Start: 2023-01-01 | End: 2023-01-01

## 2023-01-01 RX ORDER — METOPROLOL TARTRATE 50 MG
1 TABLET ORAL
Refills: 0 | DISCHARGE

## 2023-01-01 RX ORDER — BACLOFEN 20 MG/1
20 TABLET ORAL 3 TIMES DAILY
Qty: 270 | Refills: 3 | Status: ACTIVE | COMMUNITY

## 2023-01-01 RX ORDER — DEXTROSE 50 % IN WATER 50 %
25 SYRINGE (ML) INTRAVENOUS ONCE
Refills: 0 | Status: DISCONTINUED | OUTPATIENT
Start: 2023-01-01 | End: 2023-01-01

## 2023-01-01 RX ORDER — DILTIAZEM HYDROCHLORIDE 180 MG/1
180 CAPSULE, EXTENDED RELEASE ORAL
Qty: 90 | Refills: 0 | Status: DISCONTINUED | COMMUNITY
Start: 2021-11-10 | End: 2023-01-01

## 2023-01-01 RX ORDER — ERYTHROMYCIN 5 MG/G
5 OINTMENT OPHTHALMIC
Qty: 1 | Refills: 0 | Status: DISCONTINUED | COMMUNITY
Start: 2022-11-10 | End: 2023-01-01

## 2023-01-01 RX ORDER — RILUZOLE 50 MG/1
50 TABLET, FILM COATED ORAL
Qty: 180 | Refills: 3 | Status: ACTIVE | COMMUNITY
Start: 2021-06-14

## 2023-01-01 RX ORDER — ALBUTEROL 90 UG/1
2.5 AEROSOL, METERED ORAL EVERY 6 HOURS
Refills: 0 | Status: DISCONTINUED | OUTPATIENT
Start: 2023-01-01 | End: 2023-01-01

## 2023-01-01 RX ORDER — UBIDECARENONE/VIT E ACET 100MG-5
CAPSULE ORAL
Refills: 0 | Status: ACTIVE | COMMUNITY

## 2023-01-01 RX ORDER — FORMOTEROL FUMARATE 12 MCG
2 CAPSULE, WITH INHALATION DEVICE INHALATION
Refills: 0 | DISCHARGE

## 2023-01-01 RX ORDER — BUDESONIDE, MICRONIZED 100 %
0.5 POWDER (GRAM) MISCELLANEOUS
Refills: 0 | Status: DISCONTINUED | OUTPATIENT
Start: 2023-01-01 | End: 2023-01-01

## 2023-01-01 RX ORDER — MONTELUKAST 10 MG/1
10 TABLET, FILM COATED ORAL
Qty: 90 | Refills: 0 | Status: DISCONTINUED | COMMUNITY
Start: 2021-09-20 | End: 2023-01-01

## 2023-01-01 RX ORDER — OLOPATADINE HYDROCHLORIDE 665 UG/1
0.6 SPRAY, METERED NASAL
Qty: 3 | Refills: 1 | Status: DISCONTINUED | COMMUNITY
Start: 2021-04-13 | End: 2023-01-01

## 2023-01-01 RX ORDER — CARBINOXAMINE MALEATE 6 MG/1
6 TABLET ORAL
Qty: 90 | Refills: 1 | Status: DISCONTINUED | COMMUNITY
Start: 2021-09-20 | End: 2023-01-01

## 2023-01-01 RX ORDER — METOPROLOL SUCCINATE 25 MG/1
25 TABLET, EXTENDED RELEASE ORAL
Qty: 90 | Refills: 3 | Status: ACTIVE | COMMUNITY
Start: 2020-10-22

## 2023-01-01 RX ORDER — ACETAMINOPHEN 500 MG
650 TABLET ORAL EVERY 6 HOURS
Refills: 0 | Status: DISCONTINUED | OUTPATIENT
Start: 2023-01-01 | End: 2023-01-01

## 2023-01-01 RX ORDER — CHLORHEXIDINE GLUCONATE 213 G/1000ML
1 SOLUTION TOPICAL DAILY
Refills: 0 | Status: DISCONTINUED | OUTPATIENT
Start: 2023-01-01 | End: 2023-01-01

## 2023-01-01 RX ORDER — ASPIRIN/CALCIUM CARB/MAGNESIUM 324 MG
81 TABLET ORAL DAILY
Refills: 0 | Status: DISCONTINUED | OUTPATIENT
Start: 2023-01-01 | End: 2023-01-01

## 2023-01-01 RX ORDER — BACLOFEN 100 %
2 POWDER (GRAM) MISCELLANEOUS
Refills: 0 | DISCHARGE

## 2023-01-01 RX ORDER — INSULIN LISPRO 100/ML
VIAL (ML) SUBCUTANEOUS
Refills: 0 | Status: DISCONTINUED | OUTPATIENT
Start: 2023-01-01 | End: 2023-01-01

## 2023-01-01 RX ORDER — GLUCAGON INJECTION, SOLUTION 0.5 MG/.1ML
1 INJECTION, SOLUTION SUBCUTANEOUS ONCE
Refills: 0 | Status: DISCONTINUED | OUTPATIENT
Start: 2023-01-01 | End: 2023-01-01

## 2023-01-01 RX ORDER — PANTOPRAZOLE 40 MG/1
40 TABLET, DELAYED RELEASE ORAL
Qty: 90 | Refills: 1 | Status: ACTIVE | COMMUNITY
Start: 2022-10-04

## 2023-01-01 RX ORDER — FAMOTIDINE 40 MG/1
40 TABLET, FILM COATED ORAL
Qty: 100 | Refills: 1 | Status: ACTIVE | COMMUNITY
Start: 2023-01-01

## 2023-01-01 RX ORDER — MUPIROCIN 20 MG/G
2 OINTMENT TOPICAL 3 TIMES DAILY
Qty: 1 | Refills: 0 | Status: ACTIVE | COMMUNITY
Start: 2023-01-01

## 2023-01-01 RX ORDER — DILTIAZEM HCL 120 MG
1 CAPSULE, EXT RELEASE 24 HR ORAL
Refills: 0 | DISCHARGE

## 2023-01-01 RX ORDER — FORMOTEROL FUMARATE DIHYDRATE 0.02 MG/2ML
20 SOLUTION RESPIRATORY (INHALATION)
Qty: 60 | Refills: 5 | Status: ACTIVE | COMMUNITY
Start: 2022-10-04

## 2023-01-01 RX ORDER — NIRMATRELVIR AND RITONAVIR 300-100 MG
20 X 150 MG & KIT ORAL
Qty: 1 | Refills: 0 | Status: COMPLETED | COMMUNITY
Start: 2023-01-01 | End: 2023-01-01

## 2023-01-01 RX ORDER — KETOCONAZOLE 20.5 MG/ML
2 SHAMPOO, SUSPENSION TOPICAL
Qty: 120 | Refills: 0 | Status: ACTIVE | COMMUNITY
Start: 2021-12-18

## 2023-01-01 RX ORDER — MORPHINE SULFATE 10 MG/5ML
10 SOLUTION ORAL
Qty: 1 | Refills: 0 | Status: ACTIVE | COMMUNITY
Start: 2023-01-01 | End: 1900-01-01

## 2023-01-01 RX ORDER — IPRATROPIUM BROMIDE 0.2 MG/ML
500 SOLUTION, NON-ORAL INHALATION EVERY 6 HOURS
Refills: 0 | Status: DISCONTINUED | OUTPATIENT
Start: 2023-01-01 | End: 2023-01-01

## 2023-01-01 RX ORDER — RILUZOLE 50 MG/1
50 TABLET, FILM COATED ORAL
Qty: 180 | Refills: 3 | Status: DISCONTINUED | COMMUNITY
Start: 1900-01-01 | End: 2023-01-01

## 2023-01-01 RX ORDER — BACLOFEN 100 %
20 POWDER (GRAM) MISCELLANEOUS DAILY
Refills: 0 | Status: DISCONTINUED | OUTPATIENT
Start: 2023-01-01 | End: 2023-01-01

## 2023-01-01 RX ORDER — PANTOPRAZOLE 40 MG/1
40 TABLET, DELAYED RELEASE ORAL
Qty: 90 | Refills: 3 | Status: ACTIVE | COMMUNITY
Start: 2023-01-01

## 2023-01-01 RX ORDER — OLOPATADINE HYDROCHLORIDE 665 UG/1
0.6 SPRAY, METERED NASAL
Qty: 3 | Refills: 1 | Status: ACTIVE | COMMUNITY
Start: 2022-05-24

## 2023-01-01 RX ORDER — UBIDECARENONE 100 MG
1 CAPSULE ORAL
Refills: 0 | DISCHARGE

## 2023-01-01 RX ORDER — BISACODYL 10 MG/1
10 SUPPOSITORY RECTAL DAILY
Qty: 1 | Refills: 0 | Status: ACTIVE | COMMUNITY
Start: 2023-01-01

## 2023-01-01 RX ORDER — HYDROCORTISONE 25 MG/G
2.5 CREAM TOPICAL
Qty: 30 | Refills: 0 | Status: ACTIVE | COMMUNITY
Start: 2022-12-06

## 2023-01-01 RX ORDER — DEXTROSE 50 % IN WATER 50 %
15 SYRINGE (ML) INTRAVENOUS ONCE
Refills: 0 | Status: DISCONTINUED | OUTPATIENT
Start: 2023-01-01 | End: 2023-01-01

## 2023-01-01 RX ORDER — LORAZEPAM 0.5 MG/1
0.5 TABLET ORAL
Qty: 90 | Refills: 0 | Status: ACTIVE | COMMUNITY
Start: 2023-01-01 | End: 1900-01-01

## 2023-01-01 RX ORDER — ASPIRIN/CALCIUM CARB/MAGNESIUM 324 MG
0 TABLET ORAL
Refills: 0 | DISCHARGE

## 2023-01-01 RX ORDER — DILTIAZEM HCL 120 MG
120 CAPSULE, EXT RELEASE 24 HR ORAL DAILY
Refills: 0 | Status: DISCONTINUED | OUTPATIENT
Start: 2023-01-01 | End: 2023-01-01

## 2023-01-01 RX ORDER — MILK THISTLE 150 MG
0 CAPSULE ORAL
Refills: 0 | DISCHARGE

## 2023-01-01 RX ADMIN — Medication 500 MICROGRAM(S): at 18:29

## 2023-01-01 RX ADMIN — ENOXAPARIN SODIUM 40 MILLIGRAM(S): 100 INJECTION SUBCUTANEOUS at 18:29

## 2023-01-01 RX ADMIN — PANTOPRAZOLE SODIUM 40 MILLIGRAM(S): 20 TABLET, DELAYED RELEASE ORAL at 06:26

## 2023-01-01 RX ADMIN — Medication 0.5 MILLIGRAM(S): at 05:52

## 2023-01-01 RX ADMIN — Medication 20 MILLIGRAM(S): at 12:40

## 2023-01-01 RX ADMIN — Medication 500 MICROGRAM(S): at 12:41

## 2023-01-01 RX ADMIN — Medication 25 MILLIGRAM(S): at 05:52

## 2023-01-01 RX ADMIN — Medication 81 MILLIGRAM(S): at 12:40

## 2023-01-01 RX ADMIN — SODIUM CHLORIDE 75 MILLILITER(S): 9 INJECTION INTRAMUSCULAR; INTRAVENOUS; SUBCUTANEOUS at 20:16

## 2023-01-01 RX ADMIN — Medication 0.5 MILLIGRAM(S): at 18:29

## 2023-01-01 RX ADMIN — Medication 500 MICROGRAM(S): at 05:52

## 2023-01-01 RX ADMIN — SODIUM CHLORIDE 75 MILLILITER(S): 9 INJECTION INTRAMUSCULAR; INTRAVENOUS; SUBCUTANEOUS at 07:05

## 2023-01-01 RX ADMIN — Medication 120 MILLIGRAM(S): at 05:52

## 2023-01-09 PROBLEM — E66.9 OBESITY (BMI 30.0-34.9): Status: ACTIVE | Noted: 2020-03-10

## 2023-01-09 PROBLEM — R06.83 SNORING: Status: ACTIVE | Noted: 2021-04-13

## 2023-01-09 NOTE — PHYSICAL EXAM
[No Acute Distress] : no acute distress [Normal Oropharynx] : normal oropharynx [Normal Appearance] : normal appearance [No Neck Mass] : no neck mass [Normal Rate/Rhythm] : normal rate/rhythm [Normal S1, S2] : normal s1, s2 [No Murmurs] : no murmurs [No Resp Distress] : no resp distress [Clear to Auscultation Bilaterally] : clear to auscultation bilaterally [No Abnormalities] : no abnormalities [Benign] : benign [Normal Gait] : normal gait [No Clubbing] : no clubbing [No Cyanosis] : no cyanosis [FROM] : FROM [Normal Color/ Pigmentation] : normal color/ pigmentation [No Focal Deficits] : no focal deficits [Oriented x3] : oriented x3 [Normal Affect] : normal affect [II] : Mallampati Class: II [TextBox_2] : OW , wheelchair  [TextBox_68] : I:E 1:3, clear [TextBox_105] : 2-3+ dependent edema

## 2023-01-09 NOTE — PROCEDURE
[FreeTextEntry1] : \par FULL PFTs reveals severe flows; FEV1 was  1.15L which is 29% of predicted; severe reduced lung volumes; moderate reduced diffusion of 16.3, which is 57% of predicted; normal flow volume loop ; LAQUITA severely reduced PI max 38 of 36%, PE max 44 of 22%\par \par feno; unable

## 2023-01-09 NOTE — ADDENDUM
[FreeTextEntry1] : Documented by Cari Cristobal acting as a scribe for Dr. Christiano Pérez on 01/09/2023 \par \par All medical record entries made by the Scribe were at my, Dr. Christiano Pérez's, direction and personally dictated by me on 01/09/2023 . I have reviewed the chart and agree that the record accurately reflects my personal performance of the history, physical exam, assessment and plan. I have also personally directed, reviewed, and agree with the discharge instructions.

## 2023-01-09 NOTE — ASSESSMENT
[FreeTextEntry1] : Mr. CODY is a 67 year old male with a history of ALS, HTN, asthma, OW, arthritis, Kidney stones, Skin CA (melanoma), GERD, Stomach Ulcers present in the office for an follow up pulmonary evaluation. #1 Chronic Cough, #2 SOB-Asthma, restrictive dysfunction, LAQUITA weakness. (+)ARELY severe - more compliant with astral device- LPR/ RADS/ PNDz- progressive decline in LAQUITA \par \par His shortness of breath is multifactorial due to:\par -poor mechanics of breathing \par -out of shape / overweight\par -pulmonary disease\par - Restrictive dysfunction, mild asthma \par -?cardiac disease \par \par His chronic cough is felt to be multifactorial due to:\par -Asthma (Therapy as mentioned) \par -Post nasal drip syndrome/ Allergy \par -GERD / LPR\par \par problem 1: Restrictive Dysfunction (ALS) \par -complete regular PFTs and LAQUITA\par -continue Dr. Lyn Neurologic Evaluations\par -Astral ([facility])\par \par Problem 1A: OSAS (severe) - improve with new mask\par -Astral ([facility]) - in place- "mouth kote"\par -Sleep apnea is associated with adverse clinical consequences which can affect most organ systems. Cardiovascular disease risk includes arrhythmias, atrial fibrillation, hypertension, coronary artery disease, and stroke. Metabolic disorders include diabetes type 2, non-alcoholic fatty liver disease. Mood disorder especially depression; and cognitive decline especially in the elderly. Associations with chronic reflux/Cortes’s esophagus some but not all inclusive. \par -Reasons include arousal consistent with hypopnea; respiratory events most prominent in REM sleep or supine position; therefore sleep staging and body position are important for accurate diagnosis and estimation of AHI. \par \par Problem 2: mild Asthma\par -continue Singulair 10 mg QHS \par -NC Trelegy 200 1 inhalation QD \par -add Performist 1 unit dose via nebulizer, BID \par -Add budesonide 0.5 BID via nebulizer\par Asthma is believed to be caused by inherited (genetic) and environmental factor, but its exact cause is unknown. Asthma may be triggered by allergens, lung infections, or irritants in the air. Asthma triggers are different for each person \par -Inhaler technique reviewed as well as oral hygiene techniques reviewed with patient. Avoidance of cold air, extremes of temperature, rescue inhaler should be used before exercise. Order of medication reviewed with patient. Recommended use of a cool mist humidifier in the bedroom. \par \par Problem 3: Allergies (quiet)\par -continue Ryvent 6mg QHS\par -get Blood work to include: asthma panel, food IgE panel, IgE level, eosinophil level, vitamin D level (NC)\par -continue Olopatadine 0.6% at 1 sniff/nostril BID (restart)\par Environmental measures for allergies were encouraged including mattress and pillow covers, air purifier, and environmental controls.\par \par Problem 3A: Low Vitamin D\par -on Rx\par Has been associated with asthma exacerbations and increased allergic symptoms. The goal based on recent information is maintaining levels between 50-70 and low normal is 30. Recommended 50,000 units every two weeks to once a month depending on the level.\par \par Problem 4: LPR/ Globus/ Reflux\par -add Protonix 40 mg QAM, pre-breakfast \par -continue Pepcid 40 mg QHS \par -Rule of 2s: avoid eating too much, eating too late, eating too spicy, eating two hours before bed.\par -Things to avoid including overeating, spicy foods, tight clothing, eating within three hours of bed, this list is not all inclusive. \par -For treatment of reflux, possible options discussed including diet control, H2 blockers, PPIs, as well as coating motility agents discussed as treatment options. Timing of meals and proximity of last meal to sleep were discussed. If symptoms persist, a formal gastrointestinal evaluation is needed.\par \par Problem 5: Mucopurulent Bronchitis\par -add vest therapy\par \par Problem 5: (+) ARELY - severe\par -s/p Home Sleep study 10/2021 - Astral in place \par Sleep apnea is associated with adverse clinical consequences which can affect most organ systems. Cardiovascular disease risk includes arrhythmias, atrial fibrillation, hypertension, coronary artery disease, and stroke. Metabolic disorders include diabetes type 2, non-alcoholic fatty liver disease. Mood disorder especially depression; and cognitive decline especially in the elderly. Associations with chronic reflux/Cortes’s esophagus some but not all inclusive. \par -Reasons include arousal consistent with hypopnea; respiratory events most prominent in REM sleep or supine position; therefore sleep staging and body position are important for accurate diagnosis and estimation of AHI. \par \par Treatment options discussed including CPAP/BiPAP machine, oral appliance, ProVent therapy, Oxy-Aid by [employer], new technologies, or positional sleep.Recommended use of the CPAP machine for moderate (AHI >15), moderate to severe (AHI 15-30) and severe patients (AHI > 30). Recommended weight loss which can reduce AHI especially in weight loss of greater than 5% of BMI. Positional sleep is recommended in those with low AHI, low-moderate BMI, and younger age. For severe sleep apnea, the hypoglossal nerve stimulator was recommended as well. \par \par problem 6: cardiac disease\par -recommended to continue to follow up with Cardiologist if needed\par \par problem 7: poor breathing mechanics\par -Recommend "Breather" device\par -Recommended Wim Hof and Buteyko breathing techniques \par -Proper breathing techniques were reviewed with an emphasis of exhalation. Patient instructed to breath in for 1 second and out for four seconds. Patient was encouraged to not talk while walking. \par \par problem 8: out of shape / overweight\par -Recommend "Muniq" OTC Supplement for weight loss, energy levels, and blood sugar levels \par -Weight loss, exercise, and diet control were discussed and are highly encouraged. Treatment options were given such as, aqua therapy, and contacting a nutritionist. Recommended to use the elliptical, stationary bike, less use of treadmill. Mindful eating was explained to the patient Obesity is associated with worsening asthma, shortness of breath, and potential for cardiac disease, diabetes, and other underlying medical conditions\par \par problem 9: health maintenance \par - Portable CPAP for exercise \par -s/p COVID-19 vaccine x4\par -Recommend Dr. Hodges's Intestinal Formula for constipation\par -recommended yearly flu shot 2022 \par -recommended strep pneumonia vaccines: Prevnar-13 vaccine (3/2020), followed by Pneumo vaccine 23 one year following after 65 (11/2021)\par -recommended early intervention for Upper Respiratory Infections (URIs)\par -recommended regular osteoporosis evaluations\par -recommended early dermatological evaluations\par -recommended after the age of 50 to the age of 70, colonoscopy every 5 years\par \par F/U in 4 months.\par He is encouraged to call with any changes, concerns, or questions\par

## 2023-01-09 NOTE — HISTORY OF PRESENT ILLNESS
[TextBox_4] : Mr. CODY is a 67 year old male presenting to the office today for follow up pulmonary evaluation. His chief complaint is\par - he has been having issues with constipation, and issues with reclining \par - he notes when he goes to PT and they put him on the table he finds it hard to breathe. This especially happens when they put his knees to his chest. About 4 sets with 30 seconds of his leg up to his chest. \par - no chest pain or pressure\par - he notes a feeling of anxiety at PT and he cannot get enough breaths in when the PT is doing his exercise where his knee comes up. \par - when the chair is inverted it is hard for him to breathe \par - he notes he uses his vent 9hrs a day \par - he notes he has been eating well \par - he notes he has phlegm in the mornings \par - he notes his neurologist is  \par - he notes his sinuses have been very good \par - he notes his CPAP would help immensely during PT but he is not sure if his is portable \par -He denies any visual issues, headaches, nausea, vomiting, fever, chills, sweats, chest pains, chest pressure, diarrhea, dysphagia, myalgia, dizziness, leg swelling, leg pain, itchy eyes, itchy ears, heartburn, reflux, or sour taste in the mouth.\par

## 2023-01-26 NOTE — ASSESSMENT
[FreeTextEntry1] : SPEECH - LANGUAGE EVALUATION \par \par Date of Report: 1/24/23 \par Date of Evaluation: 1/24/23 \par Patient Name: Bello Li  \par YOB: 1955 \par Primary Diagnosis: Dysarthria  \par Treatment Diagnosis: Dysarthria  \par Referring Physician:  Dr. Lyn  \par \par  \par REASON FOR REFERRAL: Mr. Bello Li is a 68-year-old male who was referred for a formal speech/language evaluation by his physician, Dr. Lyn, due to concerns of communication abilities relating to diagnosis of ALS.  \par \par PRESENTING PROBLEM. Mr. Li arrived to the evaluation accompanied by his home RN, Sergio, and was received alert, awake, and in no apparent distress. Patient presented in his power wheelchair. Per charting and patient report, the patient was seen for a clinical swallow and speech/language evaluation at the ALS Clinic on 11/14/22, at which time patient was found to present with a mild dysarthria and swallow function was grossly within functional limits. The patient is reporting worsening symptoms in regards to speech intelligibility and new onset of swallowing difficulty over the last two months. The patient reported increased work of breathing when communicating and stated he needs to “divide sentences” to take a breath and “get the words out.” The patient is also reporting reduced vocal volume, with decline noted since November, and a worsening hoarse vocal quality with intermittent need to throat clear. The patient is reporting new onset of a globus sensation with solids during meals over the last 3-4 weeks. Upon clinician questioning, he reports a liquid wash does assist with clearance of sensation and denied odynophagia or any recent PNAs. Furthermore, the patient endorsed that although he did start the process of voice banking with [employer], he has been unable to complete it at this time. It should be noted that the patient also reported a decline in upper extremity motor function abilities with subsequent increased difficulty manually managing his power wheel chair.  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) \par Nontraumatic incomplete tear of right rotator cuff (726.13) (M75.111) \par Obesity (BMI 30.0-34.9) (278.00) (E66.9) \par Obstructive pattern present on pulmonary function testing (794.2) (R94.2) \par ARELY (obstructive sleep apnea) (327.23) (G47.33) \par Persistent cough (786.2) (R05.3) \par Primary osteoarthritis of left knee (715.16) (M17.12) \par Quadriceps tendinitis (727.09) (M76.899) \par Restrictive pattern present on pulmonary function testing (794.2) (R94.2) \par Shoulder arthritis (716.91) (M19.019) \par Snoring (786.09) (R06.83) \par SOB (shortness of breath) (786.05) (R06.02) \par Stye (373.11) (H00.019) \par Swelling of both lower extremities (729.81) (M79.89) \par Urinary frequency (788.41) (R35.0) \par Weakness of distal arms and legs (729.89) (R29.898) \par Weakness of trunk musculature (728.87) (M62.81) \par \par Past Medical History \par History of Asthmatic bronchitis with acute exacerbation, unspecified asthma severity, \par unspecified whether persistent (493.92) (J45.901) \par History of Herniated disc (722.2) \par History of cardiac arrhythmia (V12.59) (Z86.79) \par History of hypertension (V12.59) (Z86.79) \par History of kidney stones (V13.01) (Z87.442) \par History of malignant neoplasm of skin (V10.83) (Z85.828) \par History of stomach ulcers (V12.79) (Z87.11) \par History of Melanoma of forearm, right (172.6) (C43.61) \par \par Surgical History \par History of Appendectomy \par History of Biopsy Skin \par History of Excision melanoma \par History of Excision Of Lesion Forearms \par \par CLINICAL FINDINGS: \par \par ORAL PERIPHERAL EXAM:  Oral-Facial examination revealed facial symmetry to be unremarkable. Intra-oral assessment revealed mandibular and labial strength and ROM to be judged as grossly within functional limits. Lingual strength and range of motion were mildly reduced upon protrusion, elevation and lateralization. The oral cavity was adequately hydrated and upper airway breath sounds were clear at baseline. Of note, patient did not present with pooling or anterior loss of saliva during assessment; however, patient reported intermittent “drooling” at times.  \par \par SPEECH:  The patient’s speech was informally assessed through participating in a conversation with clinician as well as picture description.  Patient demonstrated a mild-moderate Dysarthria characterized by low vocal volume, mildly reduced articulatory precision, and slower rate at the sentence/conversational level.  The patient presented with reduced respiratory-phonatory coordination marked by increased work of breath due to use of thoracic/clavicle breathing, which was noted to increase when the patient engaged in phonatory tasks of increasing length/complexity. Patient's reduced breath support negatively impacted his overall speech production/intelligibility and ability to communicate at the conversational level, increasing severity of Dysarthria. Vocal quality was hoarse and strained along with restricted pitch range noted. \par \par LANGUAGE: Linguistic functioning was assessed informally and formally as well to rule out a language disorder. Patient was administered subtests of the Burn’s Left Hemisphere Inventory and the Right Hemisphere Inventory.  Receptive and expressive language skills were grossly within functional limits marked by ability to follow multi-step directives, respond appropriately to yes/no and open-ended WH questions and independently express wants/needs without difficulty at the conversational level. The patient was grossly oriented to all concepts.  \par \par Auditory Comprehension: 100% \par Yes/No Questions: 100% \par Comprehension of words and sentences: 100% \par Body Part Identification: 100% \par Left/Right Discrimination: 100%  \par Two and Three Step Commands: 100% \par Auditory Retention of Sentences: 100% \par Auditory Retention of Paragraphs: 90% \par \par Verbal Expression:  \par Automatic Speech: 100% \par Verbal Repetition: 100% \par Responsive Naming (nouns): 100% \par Responsive Naming (verbs): 100% \par Sentence Completion: 100% \par Verbal Fluency:  15 WPM \par \par HEARING: Hearing abilities were adequate for the purposes of today’s evaluation \par \par \par IMPRESSION:  \par 1. Mild-Moderate Dysarthria \par 2. Mild Dysphonia \par \par PROGNOSIS: Good with therapeutic intervention and adherence to HEP \par \par RECOMMENDATIONS:  \par 1. Recommend a short course of speech therapy (CPT 48714) 1x/week for 30 minutes for 4-6 weeks to facilitate use of compensatory strategies to aid in increasing overall speech intelligibility and functional communication.   \par 2. Recommend an objective swallow study of a Modified Barium Swallow Study (MBSS) to further assess swallow mechanism given abovementioned complaints of globus sensation during meals.  \par 3. Recommend an ENT consult to further assess laryngeal function given abovementioned Dysphonia.  \par 4. Consider formal AAC evaluation and suggest completion of Voice Banking due to anticipated changes in voice and motor speech function given the progressive nature of ALS.   \par 6. Follow up with referring physicians as directed.  \par \par Education: Patient was trained in Diaphragmatic breathing technique and encouraged to continue using compensatory strategies to aid in his communication skills with others.  \par \par Please contact this Center at [phone] if additional information is needed. \par \par \par Melissa Lowery M.A. CCC-SLP \par Speech-Language Pathologist  \par [facility]. \par \par

## 2023-02-13 NOTE — ASSESSMENT
[FreeTextEntry1] : Clinical Dysphagia Evaluation\par \par Bello Chavez is a 67 year old male with history of ALS who was seen at the ALS clinic this PM. Patient was accompanied by spouse. Patient presented in power wheelchair. He continues to consume a regular solid / thin liquid diet. He denies difficulty swallowing at this time. Patient does however present with progressive respiratory issues, which are impacting his overall speech intelligibility. He was seen by this department for a speech-language evaluation on 1/24/23, and is now pending AAC evaluation on 3/1/23.\par \par Current Diet: Regular Solids and Thin Liquids, as reported by patient\par \par Oral-peripheral Assessment: Oral musculature was within functional limits. Facial symmetry was intact. Oral cavity was well hydrated without evidence of pooled secretions. Labial strength and range of motion were WFLs upon pursing and retraction. Lingual strength and range of motion were mildly reduced upon protrusion, elevation and lateralization, +fasciculations. Volitional swallow was intact. Volitional cough was weak.\par \par Oral Feeding Assessment:\par Consistencies Administered: Regular Solids; Pureed; Thin Liquids\par Mode of Presentation: Teaspoon; Cup Sip\par \par Oral Stage: Patient demonstrated a mild oral stage of swallowing characterized by mildly increased mastication time, delayed bolus formation and delayed anterior-posterior transfer for pureed, regular solid and thin liquid textures. Adequtae oral clearance noted with no evidence of anterior spillage, oral stasis or buccal pocketing noted.\par \par Pharyngeal Stage: Patient demonstrated a mild pharyngeal dysphagia characterized by a mild delay suspected with reduced hyolaryngeal elevation/excursion upon digital palpation across PO trials of pureed, regular solid and thin liquid textures. No overt, clinical signs/symptoms of laryngeal penetration/aspiration noted.\par \par Impressions:\par 1) Mild Oropharyngeal Dysphagia\par \par Recommendations:\par 1) Continue a Regular Solid / Thin Liquid diet. Avoid hard, dry textures. \par 2) Dysphagia Guidelines: Maintain upright position (90 degree angle) during/after meals for 30 minutes; Slow rate of intake; Small bites; Small single cup sips; NO straws.\par 3) SLP to follow up for AAC evaluation as scheduled for 3/1/23\par 4) Suggest consult with Registered Dietitian to assess / monitor daily caloric and hydration needs as patient is judged to be at increased risk for malnutrition/dehydration in the setting of ALS. Alternate means of nutrition/hydration may be considered at this point in consideration of overall medical presentation.\par 5) Follow up with Neurologist and ALS clinic as directed\par \par Education: Above recommendations were discussed with the patient and his spouse. Full understanding was demonstrated.\par \par Should you have additional questions/concerns, please contact this department at [phone].\par \par Marilyn Mooney M.S., CCC-SLP\par

## 2023-02-13 NOTE — ASSESSMENT
[FreeTextEntry1] : Clinical Dysphagia Evaluation\par \par Bello Chavez is a 67 year old male with history of ALS who was seen at the ALS clinic this PM. Patient was accompanied by spouse. Patient presented in power wheelchair. He continues to consume a regular solid / thin liquid diet. He denies difficulty swallowing at this time. Patient does however present with progressive respiratory issues, which are impacting his overall speech intelligibility. He was seen by this department for a speech-language evaluation on 1/24/23, and is now pending AAC evaluation on 3/1/23.\par \par Current Diet: Regular Solids and Thin Liquids, as reported by patient\par \par Oral-peripheral Assessment: Oral musculature was within functional limits. Facial symmetry was intact. Oral cavity was well hydrated without evidence of pooled secretions. Labial strength and range of motion were WFLs upon pursing and retraction. Lingual strength and range of motion were mildly reduced upon protrusion, elevation and lateralization, +fasciculations. Volitional swallow was intact. Volitional cough was weak.\par \par Oral Feeding Assessment:\par Consistencies Administered: Regular Solids; Pureed; Thin Liquids\par Mode of Presentation: Teaspoon; Cup Sip\par \par Oral Stage: Patient demonstrated a mild oral stage of swallowing characterized by mildly increased mastication time, delayed bolus formation and delayed anterior-posterior transfer for pureed, regular solid and thin liquid textures. Adequtae oral clearance noted with no evidence of anterior spillage, oral stasis or buccal pocketing noted.\par \par Pharyngeal Stage: Patient demonstrated a mild pharyngeal dysphagia characterized by a mild delay suspected with reduced hyolaryngeal elevation/excursion upon digital palpation across PO trials of pureed, regular solid and thin liquid textures. No overt, clinical signs/symptoms of laryngeal penetration/aspiration noted.\par \par Impressions:\par 1) Mild Oropharyngeal Dysphagia\par \par Recommendations:\par 1) Continue a Regular Solid / Thin Liquid diet. Avoid hard, dry textures. \par 2) Dysphagia Guidelines: Maintain upright position (90 degree angle) during/after meals for 30 minutes; Slow rate of intake; Small bites; Small single cup sips; NO straws.\par 3) SLP to follow up for AAC evaluation as scheduled for 3/1/23\par 4) Suggest consult with Registered Dietitian to assess / monitor daily caloric and hydration needs as patient is judged to be at increased risk for malnutrition/dehydration in the setting of ALS. Alternate means of nutrition/hydration may be considered at this point in consideration of overall medical presentation.\par 5) Follow up with Neurologist and ALS clinic as directed\par \par Education: Above recommendations were discussed with the patient and his spouse. Full understanding was demonstrated.\par \par Should you have additional questions/concerns, please contact this department at [phone].\par \par Marilyn Mooney M.S., CCC-SLP\par

## 2023-03-03 NOTE — ASSESSMENT
[FreeTextEntry1] : The patient was seen for a formal AAC evaluation this AM. Chela Garcia, rep from [employer], was also present for today's evaluation. The full report will be scanned in once completed. This department can be contacted at [phone] or [phone].

## 2023-03-13 NOTE — ASSESSMENT
[FreeTextEntry1] : MODIFIED BARIUM SWALLOW STUDY \par Date of Report: 3/10/23 \par Date of Evaluation: 3/10/23 \par Patient Name: Bello Chavez      \par YOB: 1955 \par Primary Diagnosis: OroPharyngeal Dysphagia \par Treatment Diagnosis:  OroPharyngeal Dysphagia \par Referring Physician: Dr. Antoinette Lyn  \par \par IMPRESSIONS/RESULTS:  \par 1.There was Trace Laryngeal Penetration during the swallow with retrieval and adequate airway protection maintained for Thin Liquids.  \par 2.There was No Aspiration before, during or after the swallow for all PO trials.  \par \par Reason For Referral: This 68-year-old male was seen for a Modified Barium Swallow to determine patient's ability to safely tolerate an oral diet and to rule out aspiration.  Patient accompanied by his wife (Winter). Patient denies symptoms of dysphagia and reports currently consuming a regular diet.  \par \par Current Nutritional Intake: Regular Solids with Thin Liquids per patient report \par \par Medical History: Patient presents with medical history of the swallowing as per [employer]:  \par  Allergic rhinitis (477.9) (J30.9) \par Amyotrophic lateral sclerosis (335.20) (G12.21) \par Asthma (493.90) (J45.909) \par Benign essential hypertension (401.1) (I10) \par Chronic bilateral low back pain without sciatica (724.2,338.29) (M54.50,G89.29) \par Chronic cough (786.2) (R05.3) \par Dyspnea (786.09) (R06.00) \par Encounter for immunization (V03.89) (Z23) \par LPRD (laryngopharyngeal reflux disease) (478.79) (K21.9) \par Mucopurulent chronic bronchitis (491.1) (J41.1) \par 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) \par Nontraumatic incomplete tear of right rotator cuff (726.13) (M75.111) \par Obesity (BMI 30.0-34.9) (278.00) (E66.9) \par Obstructive pattern present on pulmonary function testing (794.2) (R94.2) \par ARELY (obstructive sleep apnea) (327.23) (G47.33) \par Persistent cough (786.2) (R05.3) \par Primary osteoarthritis of left knee (715.16) (M17.12) \par Quadriceps tendinitis (727.09) (M76.899) \par Restrictive pattern present on pulmonary function testing (794.2) (R94.2) \par Shoulder arthritis (716.91) (M19.019) \par Snoring (786.09) (R06.83) \par SOB (shortness of breath) (786.05) (R06.02) \par Stye (373.11) (H00.019) \par Swelling of both lower extremities (729.81) (M79.89) \par Urinary frequency (788.41) (R35.0) \par Weakness of distal arms and legs (729.89) (R29.898) \par Weakness of trunk musculature (728.87) (M62.81) \par \par ASSESSMENT \par The patient was assessed seated in the lateral plane in the [facility] Suite (in personal motorized wheelchair), with Radiologist present.  The patient was alert, cooperative.  Secretion management was adequate.  There was no coughing, throat clearing or wet/gurgly vocal quality prior to test administration.  \par \par Consistencies Administered:   \par Solids:  Puree + Regular Solids \par Liquids:  Thin Liquids (cup sip+straw sip), Mildly Thick Liquids  \par \par SUMMARY & IMPRESSION \par The patient demonstrated the following: \par Patient presents with Mild Oropharyngeal Dysphagia. Oral stage marked by adequate bolus containment, slowed mastication with adequate ability to break down regular solids and slowed anterior-posterior transport with adequate oral clearance. Pharyngeal stage marked by delayed initiation of the pharyngeal swallow trigger (bolus head over the lateral surface of the epiglottis), adequate laryngeal elevation with adequate laryngeal vestibule closure, adequate tongue base retraction and adequate pharyngeal constriction. There was Trace Laryngeal Penetration during the swallow with retrieval and adequate airway protection maintained for Thin Liquids(via both single cup sip+straw sip). There was No Aspiration before, during or after the swallow for all PO trials. \par \par An esophageal screen was not completed given patient’s physical body habitus, as fluoroscopy equipment could not accommodate A-P View  \par \par Aspiration - Penetration Scale:  \par PUREE:1 \par SOLIDS:1 \par THIN LIQUIDS: 2 \par \par Aspiration - Penetration Scale    (Marilin et al Dysphagia 11:93-98 (April 1996), Aspiration-Penetration Scale) \par 1.    Material does not enter the airway \par 2.    Material enters the airway, remains above the vocal folds, and is ejected from the airway \par \par RECOMMENDATIONS: \par 1.Continue current diet of Regular Solids with Thin Liquids  \par 2. Aspiration + Reflux Precautions  \par 3. Daily Oral Hygiene  \par 4. Registered Dietitian Consult to ensure patient is meeting adequate caloric/hydration needs, with consideration of long-term non-oral means of nutrition given diagnosis of ALS if within patient’s goals of care. \par 5.Follow up with referring Neurologist  \par 6. Follow up with treating Speech-Language Pathologist\par  \par Preliminary results discussed with patient and patient’s wife, Winter. Both verbalized understanding and will follow with referring physician and treating Speech Language Pathologist.\par \par Should you have any additional concerns, please contact the Center at [phone]. \par \par Huyen Perdue M.S. CCC-SLP  \par Speech-Language Pathologist  \par [facility]

## 2023-03-27 PROBLEM — K59.09 CHRONIC CONSTIPATION: Status: ACTIVE | Noted: 2023-01-01

## 2023-04-04 NOTE — PHYSICAL EXAM
[Person] : oriented to person [Place] : oriented to place [Time] : oriented to time [Concentration Intact] : normal concentrating ability [Visual Intact] : visual attention was ~T not ~L decreased [Comprehension] : comprehension intact [Reading] : reading intact [Past History] : adequate knowledge of personal past history [Cranial Nerves Optic (II)] : visual acuity intact bilaterally,  visual fields full to confrontation, pupils equal round and reactive to light [Cranial Nerves Oculomotor (III)] : extraocular motion intact [Cranial Nerves Trigeminal (V)] : facial sensation intact symmetrically [Cranial Nerves Vestibulocochlear (VIII)] : hearing was intact bilaterally [Cranial Nerves Glossopharyngeal (IX)] : tongue and palate midline [Cranial Nerves Hypoglossal (XII)] : there was no tongue deviation with protrusion [Cranial Nerves Facial Peripheral - Right Only] : peripheral 7th nerve weakness [Cranial Nerves Right Facial: House Grade ___(1-6)] : grade III (moderate, 60%) facial nerve function [Cranial Nerves Facial Peripheral - Left Only] : peripheral 7th nerve weakness [Cranial Nerves Left Facial: House Grade ___(1-6)] : grade III (moderate, 60%) facial nerve function [CNS Accessory - Diminished Shoulder Elevation (Trapezius)] : weakness of shoulder elevation was present bilaterally [CNS Accessory - Sternocleidomastoid Weakness Bilateral] : sternocleidomastoid weakness was present bilaterally [No Muscle Atrophy] : normal bulk in all four extremities [Motor Strength Upper Extremities Bilaterally] : there was weakness in both upper extremities [Motor Strength Lower Extremities Bilaterally] : there was weakness in both lower extremities [Sensation Tactile Decrease] : light touch was intact [Non-ambulatory] : Non-ambulatory [2+] : Ankle jerk left 2+ [Sclera] : the sclera and conjunctiva were normal [PERRL With Normal Accommodation] : pupils were equal in size, round, reactive to light, with normal accommodation [Extraocular Movements] : extraocular movements were intact [Outer Ear] : the ears and nose were normal in appearance [Naming Objects] : difficulty naming common objects [Repeating Phrases] : difficulty repeating a phrase [Writing A Sentence] : difficulty writing a sentence [Fluency] : fluency not intact [Dysarthria] : dysarthria [Past-pointing] : there was no past-pointing [Tremor] : no tremor present [Plantar Reflex Right Only] : normal on the right [Plantar Reflex Left Only] : normal on the left

## 2023-04-04 NOTE — HISTORY OF PRESENT ILLNESS
[FreeTextEntry1] : Can only move left hand fingers; operates motorized wheelchair with difficulty; dependent for all ADLs; occasional drooling and choking; no drooling at night because he uses NIV that allows him to sleep with legs raised; \par has BSC, HB, HL,

## 2023-04-04 NOTE — DISCUSSION/SUMMARY
[FreeTextEntry1] : Amyotrophic lateral sclerosis (G 12.21) (335.20)\par \par Evaluated by me today in multidisciplinary ALS clinic; required evaluations today by PT/OT/speech and swallowing therapists.  Social work needs addressed and goals of care reinforced.  End-of-life care including healthcare proxy and patient wishes were discussed.\par \par Nutritional/dietary needs discussed and addressed. Needs G-tube now\par \par Pulmonary function was assessed by respiratory therapy and spirometry.  Patient's respiratory function is very poor and could also compromise swallowing function affecting nutrition: very low FVC; recommend as per speech and swallow : advise G-tube to be ready to supplement feeds.\par FVC 21%/ NIF -40/ CO2 44/O2 92%/HR 90/RR17/ COUGH SHERRI 150  \par \par Saliva management needs assessed \par \par  PT/OT recommends: Continue outpatient\par \par  Speech swallow therapy recommends: AAC Evaluation order; regular sold thin liquid diet; mils oropharyngeal dysphagia. Speech has declined - he currently presents with severely dysarthric speech. \par \par Continue riluzole 50 mg twice daily \par Refill \par Follow-up in 6 months at ALS clinic \par \par ALS FRS14\par \par NEEDS NIV x2 he has cough assist through [employer]  and a suction pump \par \par Main ventilator -  This patient has ALS, a progressive neuromuscular disorder now presenting with findings and symptoms of respiratory  insufficiency due to progressing respiratory muscle weakness. These finding indicate an immediate need for a volume ventilator applied non invasively, for sleep and for Mouthpiece ventilation for daytime use for labored breathing. This ventilator must have adjustable alarms assuring uninterrupted ventilation and any adverse events and battery power providing portability for outside, inside, safe transfer and also as to assure ventilation during power outages. Volume focused/controlled ventilation assuring adequate minute ventilation for carbon dioxide elimination is essential. As the patients needs change, due to ALS progression and variable rate of decline, the delivered minute volume ventilation must be maintained and not require the constant in home adjustment’s basic pressure support devices such as BIPAP would require. The course of ALS will require 24/7, mouthpiece ventilation while awake and breath stacking for lung recruitment. These essential elements of ventilation and respiratory support cannot be provided with a Bipap/AVAPS. It is my professional opinion that this patient should and does require the use of non invasive volume ventilation not BIPAP.\par \par Cough assist - Cough assist is being ordered for chest wall expansion and mobilization of secretions due to weakened cough flow.\par \par Second Ventilator - Patient requires second ventilator, with substantial battery power, mounted on power wheelchair to facilitate daily mobility around the home and outside the home as needed. In addition the wheelchair mounted portable ventilator will facilitate daily safe transfers from bed to wheelchair and back allowing uninterrupted ventilation support. Due to patient’s extreme dependence (24/7) on the ventilator, transfers without a completely assembled operating ventilator on the wheelchair become very unsafe requiring at least two caregivers because of the need to partially disassemble the ventilator circuit removing the humidifier and the placing of the unit in the carry/portable bag with all connections reattached for mounting on the chair. If anything goes wrong the patient could be harmed due to inability to breathe effectively off the ventilator for more than a few minutes. Additionally, the frequency of transfers from bed to chair will be reduced, due to the difficulties outlined, potentially leading to multiple issues associated with excessive hours spent in bed with lack of positional ventilation and perfusion variations. Wheelchair NIV will provide different settings and modes of ventilation from the bedside unit such as mouthpiece ventilation (MPV). \par

## 2023-04-04 NOTE — REVIEW OF SYSTEMS
[Feeling Poorly] : feeling poorly [Feeling Tired] : feeling tired [Sleep Disturbances] : sleep disturbances [Facial Weakness] : facial weakness [Arm Weakness] : arm weakness [Hand Weakness] :  hand weakness [Leg Weakness] : leg weakness [Inability to Walk] : inability to walk [Lower Ext Edema] : lower extremity edema [Shortness Of Breath] : shortness of breath [Wheezing] : wheezing [Cough] : cough [SOB on Exertion] : shortness of breath during exertion [Orthopnea] : orthopnea [Negative] : Musculoskeletal [Fever] : no fever [Confused or Disoriented] : no confusion [Memory Lapses or Loss] : no memory loss [Decr. Concentrating Ability] : no decrease in concentrating ability [Difficulty with Language] : ~M difficulty with language [Changed Thought Patterns] : no change in thought patterns [Poor Coordination] : good coordination [Difficulties in Speech] : speech difficulties [Numbness] : no numbness [Tingling] : no tingling [Seizures] : no convulsions [Dizziness] : no dizziness [Fainting] : no fainting [Lightheadedness] : no lightheadedness [Migraine Headache] : no migraine headache

## 2023-04-09 PROBLEM — Z71.89 ADVANCE CARE PLANNING: Status: ACTIVE | Noted: 2023-01-01

## 2023-04-09 NOTE — HISTORY OF PRESENT ILLNESS
[FreeTextEntry2] : patient is seen today for initial visit  /and enrollment into  a house call  program along with care manager\par patient is homebound due  ALS  diagnosed   2017 \par some  of the history obtained from family member  wife  Ray \par Past medical history include   ALS SVT s/p  ablation   melanoma \par  patient  still follows with   specialists  ALS clinic   pulmonary \par last hospitalization  2010 \par diet regular  appetite   but has planned   feeding tube  as per ALS  specialists   on near  future \par  eye gaze  system \par dysphagia  none reported  currently \par Weight overweight in  wheelchair \par falls none  recently \par Behavior good   at present declines  meds \par Mood can get  frustrated  but  easily  calmed down \par  memory  good  \par  sleep   good \par sensory deficits  wears  glasses \par pain  only when not moved \par Medication refills done and reconciliation  done \par Goals of care discussed and completed \par  uses BIPAP  about 12 hours  a day  \par retired  \par    wife  no children

## 2023-04-09 NOTE — CHRONIC CARE ASSESSMENT
[Other: ____] : [unfilled] [< 30 Minutes/Session] : (< 30 minutes per session) [Strength Training] : strength training [General Adherence] : and is generally adherent [PPS Score: ____] : Palliative Performance Scale (PPS) Score: [unfilled] [Class I] : [employer] Association Class Output: Class I

## 2023-04-09 NOTE — PHYSICAL EXAM
[Normal Sclera/Conjunctiva] : normal sclera/conjunctiva [Normal Outer Ear/Nose] : the ears and nose were normal in appearance [Normal Oropharynx] : the oropharynx was normal [No JVD] : no jugular venous distention [No Respiratory Distress] : no respiratory distress [Clear to Auscultation] : lungs were clear to auscultation bilaterally [Normal Rate] : heart rate was normal  [Regular Rhythm] : with a regular rhythm [Normal Bowel Sounds] : normal bowel sounds [Non Tender] : non-tender [Soft] : abdomen soft [Patient Refused] : rectal exam was refused by the patient [No CVA Tenderness] : no ~M costovertebral angle tenderness [No Skin Lesions] : no skin lesions [Oriented x3] : oriented to person, place, and time [Foot Ulcers] : no foot ulcers [de-identified] : robust   male  in recliner chair  [de-identified] : poor air entry  [de-identified] : in wheelchair  [de-identified] : 0/5  lower extremities    [de-identified] : cooperates  flat affect

## 2023-04-09 NOTE — COUNSELING
[Overweight - ( BMI 25.1 - 29.9 )] : overweight -  ( BMI 25.1 - 29.9 ) [Weight counseling provided] : weight counseling provided [Non - Smoker] : non-smoker [Smoke/CO Detectors] : smoke/CO detectors [Remove clutter and unsafe carpeting to avoid falls] : remove clutter and unsafe carpeting to avoid falls [] : foot exam [Improve pain control] : improve pain control [Decrease stress] : decrease stress [Decrease hospital use] : decrease hospital use [Minimize unnecessary interventions] : minimize unnecessary interventions [Discussed disease trajectory with patient/caregiver] : discussed disease trajectory with patient/caregiver [Likely to achieve goals/desired outcomes] : likely to achieve goals/desired outcomes [Patient/Caregiver has ___ understanding of disease process] : patient/caregiver has [unfilled] understanding of disease process [DNR] : Code Status: DNR [Limited] : Treatment Guidelines: Limited [Trial of Bipap] : Intubation: Trial of Bipap [[facility]: _____[facility] [unfilled] [_____] : HCP: [unfilled] [ - New patient with 2 or more chronic conditions; CCM discussed and patient-centered care plan established] : New patient with 2 or more chronic conditions; CCM discussed and patient-centered care plan established

## 2023-04-09 NOTE — REASON FOR VISIT
[Initial Eval - Existing Diagnosis] : an initial evaluation of an existing diagnosis [Spouse] : spouse [Pre-Visit Preparation] : pre-visit preparation was done [Intercurrent Specialty/Sub-specialty Visits] : the patient has intercurrent specialty/sub-specialty visits

## 2023-04-20 PROBLEM — R13.19 DYSPHAGIA, NEUROLOGIC: Status: ACTIVE | Noted: 2023-01-01

## 2023-04-20 NOTE — REVIEW OF SYSTEMS
[As noted in HPI] : as noted in HPI [As Noted in HPI] : as noted in HPI Patient requests all Lab and Radiology Results on their Discharge Instructions

## 2023-04-25 PROBLEM — U07.1 COVID-19 VIRUS INFECTION: Status: ACTIVE | Noted: 2023-01-01

## 2023-04-25 NOTE — ADDENDUM
[FreeTextEntry1] : Documented by Bradly Sheehan acting as a scribe for Dr. Christiano Pérez on 04/25/2023.\par \par All medical record entries made by the Scribe were at my, Dr. Christiano Pérez's, direction and personally dictated by me on 04/25/2023. I have reviewed the chart and agree that the record accurately reflects my personal performance of the history, physical exam, assessment and plan. I have also personally directed, reviewed, and agree with the discharge instructions.

## 2023-04-25 NOTE — ASSESSMENT
[FreeTextEntry1] : Mr. CODY is a 68 year old male with a history of ALS, HTN, asthma, OW, arthritis, Kidney stones, Skin CA (melanoma), GERD, Stomach Ulcers present in the office via video call for an follow up pulmonary evaluation. #1 Chronic Cough, #2 SOB-Asthma, restrictive dysfunction, LAQUITA weakness. (+)ARELY severe - more compliant with astral device- LPR/ RADS/ PNDz- progressive decline in LAQUITA - resolving COVID-19 - on Paxlovid\par \par His shortness of breath is multifactorial due to:\par -poor mechanics of breathing \par -out of shape / overweight\par -pulmonary disease\par - Restrictive dysfunction, mild asthma \par -?cardiac disease \par \par His chronic cough is felt to be multifactorial due to:\par -Asthma (Therapy as mentioned) \par -Post nasal drip syndrome/ Allergy \par -GERD / LPR\par \par problem 1: Restrictive Dysfunction (ALS) \par -complete regular PFTs and LAQUITA\par -continue Dr. Lyn Neurologic Evaluations\par -Astral ([employer])\par \par Problem 1A: OSAS (severe) - improve with new mask\par -Astral ([employer]) - in place- "mouth kote"\par -Sleep apnea is associated with adverse clinical consequences which can affect most organ systems. Cardiovascular disease risk includes arrhythmias, atrial fibrillation, hypertension, coronary artery disease, and stroke. Metabolic disorders include diabetes type 2, non-alcoholic fatty liver disease. Mood disorder especially depression; and cognitive decline especially in the elderly. Associations with chronic reflux/Cortes’s esophagus some but not all inclusive. \par -Reasons include arousal consistent with hypopnea; respiratory events most prominent in REM sleep or supine position; therefore sleep staging and body position are important for accurate diagnosis and estimation of AHI. \par \par Problem 1B: COVID-19 4/2022\par -Add Paxlovid 3 tablets per day for five days \par -recommended Delsym cough syrup\par -recommended supportive care\par \par Problem 2: mild Asthma\par -continue Singulair 10 mg QHS \par -NC Trelegy 200 1 inhalation QD \par -add Performist 1 unit dose via nebulizer, BID \par -Add budesonide 0.5 BID via nebulizer\par Asthma is believed to be caused by inherited (genetic) and environmental factor, but its exact cause is unknown. Asthma may be triggered by allergens, lung infections, or irritants in the air. Asthma triggers are different for each person \par -Inhaler technique reviewed as well as oral hygiene techniques reviewed with patient. Avoidance of cold air, extremes of temperature, rescue inhaler should be used before exercise. Order of medication reviewed with patient. Recommended use of a cool mist humidifier in the bedroom. \par \par Problem 3: Allergies (quiet)\par -continue Ryvent 6mg QHS\par -get Blood work to include: asthma panel, food IgE panel, IgE level, eosinophil level, vitamin D level (NC)\par -continue Olopatadine 0.6% at 1 sniff/nostril BID (restart)\par Environmental measures for allergies were encouraged including mattress and pillow covers, air purifier, and environmental controls.\par \par Problem 3A: Low Vitamin D\par -on Rx\par Has been associated with asthma exacerbations and increased allergic symptoms. The goal based on recent information is maintaining levels between 50-70 and low normal is 30. Recommended 50,000 units every two weeks to once a month depending on the level.\par \par Problem 4: LPR/ Globus/ Reflux\par -add Protonix 40 mg QAM, pre-breakfast \par -continue Pepcid 40 mg QHS \par -Rule of 2s: avoid eating too much, eating too late, eating too spicy, eating two hours before bed.\par -Things to avoid including overeating, spicy foods, tight clothing, eating within three hours of bed, this list is not all inclusive. \par -For treatment of reflux, possible options discussed including diet control, H2 blockers, PPIs, as well as coating motility agents discussed as treatment options. Timing of meals and proximity of last meal to sleep were discussed. If symptoms persist, a formal gastrointestinal evaluation is needed.\par \par Problem 5: Mucopurulent Bronchitis\par -add vest therapy\par \par Problem 5: (+) ARELY - severe\par -s/p Home Sleep study 10/2021 - Astral in place \par Sleep apnea is associated with adverse clinical consequences which can affect most organ systems. Cardiovascular disease risk includes arrhythmias, atrial fibrillation, hypertension, coronary artery disease, and stroke. Metabolic disorders include diabetes type 2, non-alcoholic fatty liver disease. Mood disorder especially depression; and cognitive decline especially in the elderly. Associations with chronic reflux/Cortes’s esophagus some but not all inclusive. \par -Reasons include arousal consistent with hypopnea; respiratory events most prominent in REM sleep or supine position; therefore sleep staging and body position are important for accurate diagnosis and estimation of AHI. \par \par Treatment options discussed including CPAP/BiPAP machine, oral appliance, ProVent therapy, Oxy-Aid by [employer]pitec, new technologies, or positional sleep.Recommended use of the CPAP machine for moderate (AHI >15), moderate to severe (AHI 15-30) and severe patients (AHI > 30). Recommended weight loss which can reduce AHI especially in weight loss of greater than 5% of BMI. Positional sleep is recommended in those with low AHI, low-moderate BMI, and younger age. For severe sleep apnea, the hypoglossal nerve stimulator was recommended as well. \par \par problem 6: cardiac disease\par -recommended to continue to follow up with Cardiologist if needed\par \par problem 7: poor breathing mechanics\par -Recommend "Breather" device\par -Recommended Wim Hof and Buteyko breathing techniques \par -Proper breathing techniques were reviewed with an emphasis of exhalation. Patient instructed to breath in for 1 second and out for four seconds. Patient was encouraged to not talk while walking. \par \par problem 8: out of shape / overweight\par -Recommend "Muniq" OTC Supplement for weight loss, energy levels, and blood sugar levels \par -Weight loss, exercise, and diet control were discussed and are highly encouraged. Treatment options were given such as, aqua therapy, and contacting a nutritionist. Recommended to use the elliptical, stationary bike, less use of treadmill. Mindful eating was explained to the patient Obesity is associated with worsening asthma, shortness of breath, and potential for cardiac disease, diabetes, and other underlying medical conditions\par \par problem 9: health maintenance \par - Portable CPAP for exercise \par -s/p COVID-19 vaccine x4\par -Recommend Dr. Hodges's Intestinal Formula for constipation\par -recommended yearly flu shot 2022 \par -recommended strep pneumonia vaccines: Prevnar-13 vaccine (3/2020), followed by Pneumo vaccine 23 one year following after 65 (11/2021)\par -recommended early intervention for Upper Respiratory Infections (URIs)\par -recommended regular osteoporosis evaluations\par -recommended early dermatological evaluations\par -recommended after the age of 50 to the age of 70, colonoscopy every 5 years\par \par F/U in 4 months.\par He is encouraged to call with any changes, concerns, or questions\par

## 2023-04-25 NOTE — HISTORY OF PRESENT ILLNESS
[Home] : at home, [unfilled] , at the time of the visit. [Medical Office: ([facility])___] : at the medical office located in  [Verbal consent obtained from patient] : the patient, [unfilled] [TextBox_4] : Mr. CODY is a 68 year old male presenting to the office today via video call for follow up pulmonary evaluation. His chief complaint is\par \par -he notes testing positive for COVID\par -he notes that his Sx started Saturday\par -he notes Paxlovid was started on sunday \par -he notes a bitter taste in the mouth\par -he notes that he is sleeping well \par -he notes a cough\par -he notes heartburn and reflux at times\par -he notes nausea and fatigue\par -he notes that his breathing is decent \par \par -patient denies any nausea, vomiting, chills, sweats, chest pain, chest pressure, palpitations, wheezing, diarrhea, constipation, dysphagia, myalgias, dizziness, leg swelling, leg pain, itchy eyes, itchy ears

## 2023-04-25 NOTE — REASON FOR VISIT
[Follow-Up] : a follow-up visit [TextBox_44] : via video call-restrictive dysfunction (ALS), mild asthma, Allergies, LPR/ Globus, +ARELY

## 2023-05-03 NOTE — H&P PST ADULT - PROBLEM SELECTOR PLAN 1
Percutaneous G tube placement  Pt. instructed to take his scheduled dose of baclofen morning of procedure with minimal water Percutaneous G tube placement  Pt. instructed to take his scheduled dose of baclofen morning of procedure with minimal water    Pt. does not tolerate lying in a flat position. Respiratory status best in high fowlers position

## 2023-05-03 NOTE — H&P PST ADULT - OTHER CARE PROVIDERS
Pulmonary Dr. Pérez Pulmonary Dr. Pérez [phone]; Cardiology Dr. Rivera [phone]; Dr. Lyn [phone]

## 2023-05-03 NOTE — H&P PST ADULT - NEGATIVE RESPIRATORY AND THORAX SYMPTOMS
no wheezing/no dyspnea pt. breathing best when sitting in high fowlers position/no wheezing/no dyspnea

## 2023-05-03 NOTE — H&P PST ADULT - NSICDXPASTMEDICALHX_GEN_ALL_CORE_FT
PAST MEDICAL HISTORY:  ALS (amyotrophic lateral sclerosis)     Asthma     Chronic bilateral low back pain without sciatica     Chronic constipation     COVID-19     History of atrial fibrillation     History of chronic cough     Hypertension      PAST MEDICAL HISTORY:  ALS (amyotrophic lateral sclerosis)     Arthritis     Asthma     Chronic bilateral low back pain without sciatica     Chronic constipation     COVID-19     GERD (gastroesophageal reflux disease)     History of atrial fibrillation     History of chronic cough     History of stomach ulcers     Hypertension     LPRD (laryngopharyngeal reflux disease)     Mucopurulent chronic bronchitis     Neurologic gait disorder     ARELY treated with BiPAP     Renal calculi

## 2023-05-03 NOTE — H&P PST ADULT - HISTORY OF PRESENT ILLNESS
68 year old male, with a history of ALS (diagnosed 2017), presents for percutaneous G tube placement. Pt. currently tolerates a diet of regular consistency foods, no history of aspiration. G tube placement recommended due to progressive respiratory decline secondary to ALS. Pt. with a history of Severe ARELY with BIPAP use at home.     Recently diagnosed with COVID-19 4/22/23. Pt. reports mild symptoms of slight fever, headache, cough and nausea. Treated with Paxlovid and reports he is presently back to his baseline status. Pt. spends majority of waking hours in motorized wheelchair and has 24 hour nursing care at home.

## 2023-05-03 NOTE — H&P PST ADULT - FUNCTIONAL STATUS
less than 4 METS confined to wheelchair; totally dependent upon others for mobility/less than 4 METS

## 2023-05-03 NOTE — H&P PST ADULT - PROBLEM SELECTOR PLAN 2
Pt. instructed to take his scheduled dose of formoterol & budesonide morning of procedure with minimal water

## 2023-05-09 NOTE — H&P ADULT - NSHPLABSRESULTS_GEN_ALL_CORE
CBC unremarkable  BMP grossly unremarkable aside from low Cr consistent with muscle atrophy from ALS

## 2023-05-09 NOTE — H&P ADULT - ASSESSMENT
68 year old male, with a history of ALS (diagnosed 2017), ARELY on BiPAP, HTN, AF s/p Ablation 2010 not on AC presented for percutaneous G tube placement.

## 2023-05-09 NOTE — PRE PROCEDURE NOTE - PRE PROCEDURE EVALUATION
Interventional Radiology    HPI: 68 year old male, with a history of ALS (diagnosed 2017), presents for percutaneous G tube placement. Pt. currently tolerates a diet of regular consistency foods, no history of aspiration. G tube placement recommended dueto progressive respiratory decline secondary to ALS. Pt. with a history of Severe ARELY with BIPAP use at home.    Allergies: No Known Allergies    Medications (Abx/Cardiac/Anticoagulation/Blood Products)      Data:    T(C): 37  HR: 70  BP: 149/92  RR: 20  SpO2: 97%    Exam  General: No acute distress  Chest: Non labored breathing  Abdomen: Non-distended  Extremities: No swelling, warm    Imaging: Relevant imaging reviewed.     Plan: 68y Male presents for gastrostomy tube placement.   -Risks/Benefits/alternatives explained with the patient and/or healthcare proxy and witnessed informed consent obtained.

## 2023-05-09 NOTE — PATIENT PROFILE ADULT - FALL HARM RISK - RISK INTERVENTIONS

## 2023-05-09 NOTE — H&P ADULT - PROBLEM SELECTOR PLAN 5
continue Cardizem as above, h/o Chronic AF s/p ablation in 2010, no arrhythmias noted since then  continue Metoprolol 25mg PO daily

## 2023-05-09 NOTE — H&P ADULT - TIME BILLING
Time-based billing (NON-critical care).     The necessity of the time spent during the encounter on this date of service was due to:     - Ordering, reviewing, and interpreting labs, testing, and imaging.  - Independently obtaining a review of systems and performing a physical exam  - Reviewing prior hospitalization and where necessary, outpatient records.  - Counselling and educating patient and family regarding interpretation of aforementioned items and plan of care.

## 2023-05-09 NOTE — H&P ADULT - NSICDXPASTMEDICALHX_GEN_ALL_CORE_FT
PAST MEDICAL HISTORY:  ALS (amyotrophic lateral sclerosis)     Arthritis     Asthma     Chronic bilateral low back pain without sciatica     Chronic constipation     COVID-19     GERD (gastroesophageal reflux disease)     History of atrial fibrillation     History of chronic cough     History of stomach ulcers     Hypertension     LPRD (laryngopharyngeal reflux disease)     Mucopurulent chronic bronchitis     Neurologic gait disorder     ARELY treated with BiPAP     Renal calculi

## 2023-05-09 NOTE — H&P ADULT - PROBLEM SELECTOR PLAN 6
DVT ppx: HSQ  Diet: DASH    d/w pt, HHA and family at bedside DVT ppx: unable to rx without pt weight, once pt is weighed then ppx can be ordered  Diet: DASH    d/w pt, HHA and family at bedside

## 2023-05-09 NOTE — H&P ADULT - PROBLEM SELECTOR PLAN 2
SPO2 89% on RA pre-procedure  SPO2 desaturated to 84% on RA during examination, now on 3LNC  continue NC O2 for now  Pt has portable BiPAP with O2 at the bedside, expect improvement overnight

## 2023-05-09 NOTE — H&P ADULT - HISTORY OF PRESENT ILLNESS
68 year old male, with a history of ALS (diagnosed 2017), ARELY on BiPAP, HTN, AF s/p Ablation 2010 not on AC presented for percutaneous G tube placement. Pt. currently tolerates a diet of regular consistency foods, no history of aspiration. G tube placement recommended due to progressive respiratory decline secondary to ALS. He was seen in IR recovery room. On presentation prior to the procedure his SPO2 was 89%. He is awake but drowsy post-operatively. The procedure was uncomplicated. He does not report any chest pain, sob, cough or dyspnea. He is more comfortable sitting up. He has a portable BiPAP in his wheelchair at bedside.

## 2023-05-09 NOTE — H&P ADULT - PROBLEM SELECTOR PLAN 1
s/p G tube placement by IR  pt is able to eat PO  Do not use tube presently  IR will assess in the AM and clear for use

## 2023-05-10 NOTE — PROGRESS NOTE ADULT - PROBLEM SELECTOR PLAN 2
SPO2 89% on RA pre-procedure  SPO2 desaturated to 84% on RA during examination, now on 3LNC  continue NC O2 for now  Pt has portable BiPAP with O2 at the bedside, expect improvement overnight SPO2 89% on RA pre-procedure  SPO2 desaturated to 84% on RA during examination, now on 3LNC  Pt has portable BiPAP with O2 at the bedside, expect improvement overnight  Chest Xray on 5/9 showed bibasilar patchy opacities, likely atelectasis. Small left pleural effusion.  - patient's Sp02 this morning was 96-98%  - when off BiPAP, patient's oxygen saturation is currently 98%

## 2023-05-10 NOTE — DISCHARGE NOTE PROVIDER - HOSPITAL COURSE
68 year old male, with a history of ALS (diagnosed 2017), ARELY on BiPAP, HTN, AF s/p Ablation 2010 not on AC presented for percutaneous G tube placement. Pt. currently tolerates a diet of regular consistency foods, no history of aspiration. G tube placement recommended due to progressive respiratory decline secondary to ALS. He was seen in IR recovery room. On presentation prior to the procedure his SPO2 was 89%. He is awake but drowsy post-operatively. The procedure was uncomplicated. He does not report any chest pain, sob, cough or dyspnea. He is more comfortable sitting up. He has a portable BiPAP in his wheelchair at bedside.. Once patient was brought up the medicine floor he was still requiring oxygen (3L via NC). Patient had a chest xray, which showed bibasilar patchy opacities, likely atelectasis and a small left pleural effusion. Overnight, patient was successfully weaned off oxygen and is at his baseline (BiPAP 16hrs day, 8 hours on room air). The morning of discharge, IR evaluated G-tube and cleared its use starting in the afternoon. At this time, patient would like to defer use of G-tube and continue PO intake.     Patient is hemodynamically stable and requiring no further increases in his oxygen requirement. Patient is no longer in need of inpatient management. 68 year old male, with a history of ALS (diagnosed 2017), ARELY on BiPAP, HTN, AF s/p Ablation 2010 not on AC presented for percutaneous G tube placement.     G tube placement recommended due to progressive decline secondary to ALS. On presentation prior to the procedure his SPO2 was 89%. He is awake but drowsy post-operatively. The procedure was uncomplicated. He has a portable BiPAP in his wheelchair at bedside.    Once patient was brought up the medicine floor he was still requiring oxygen (3L via NC). Patient had a chest xray, which showed bibasilar patchy opacities, likely atelectasis and a small left pleural effusion. Overnight, patient was successfully weaned off oxygen and is at his baseline (BiPAP 16hrs day, 8 hours on room air).     The morning of discharge, IR evaluated G-tube and cleared its use starting in the afternoon. At this time, patient would like to defer use of G-tube and continue PO intake if tolerated.     Patient is hemodynamically stable and requiring no further increases in his oxygen requirement. Discharged w follow up.

## 2023-05-10 NOTE — PROGRESS NOTE ADULT - ASSESSMENT
68 year old male, with a history of ALS (diagnosed 2017), ARELY on BiPAP, HTN, AF s/p Ablation 2010 not on AC presented for percutaneous G tube placement. VIEW ALL

## 2023-05-10 NOTE — PROGRESS NOTE ADULT - PROBLEM SELECTOR PLAN 6
DVT ppx: unable to rx without pt weight, once pt is weighed then ppx can be ordered  Diet: DASH    d/w pt, HHA and family at bedside DVT ppx: lovenox   Diet: DASH  Dispo: planning on discharging patient today, 5/10

## 2023-05-10 NOTE — DISCHARGE NOTE PROVIDER - NSDCFUSCHEDAPPT_GEN_ALL_CORE_FT
[facility]  EMIR Brooks  Scheduled Appointment: 05/19/2023    Vianey Briceño  [facility]  EMIR Brooks  Scheduled Appointment: 05/22/2023    Antoinette Lyn  [facility]  NEUROLOGY [address] [place] [address]  Scheduled Appointment: 07/10/2023

## 2023-05-10 NOTE — PROGRESS NOTE ADULT - PROBLEM SELECTOR PLAN 1
s/p G tube placement by IR  pt is able to eat PO  Do not use tube presently  IR will assess in the AM and clear for use s/p G tube placement by IR  pt is able to eat PO  IR cleared G-tube use as of 2pm this afternoon  - at this time, patient does not want to use G-tube and performs to remain on oral diet s/p G tube placement by IR  pt is able to eat PO  IR cleared G-tube use as of 2pm this afternoon  - at this time, patient does not want to use G-tube and prefers to remain on oral diet

## 2023-05-10 NOTE — DISCHARGE NOTE PROVIDER - CARE PROVIDER_API CALL
Antoinette Lyn)  Clinical Neurophysiology; Neurology; Sleep Medicine  [address] [facility], [address] [place] [address]  Phone: [phone]  Fax: [phone]  Established Patient  Follow Up Time: 1 week    Vianey Briceño)  Family Medicine  [address] [place] [address], [address]  [place] [address]  Phone: [phone]  Fax: [phone]  Established Patient  Follow Up Time: 1 week

## 2023-05-10 NOTE — PROGRESS NOTE ADULT - SUBJECTIVE AND OBJECTIVE BOX
Patient is a 68y old  Male who presents with a chief complaint of hypoxia (09 May 2023 15:24)     INTERVAL HPI/OVERNIGHT EVENTS:  - No acute events overnight    SUBJECTIVE  - Patient seen and evaluated at bedside  - Patient reports presence of   - Patient reports absence of fevers, chills, HA, lightheadedness, dizziness, nausea, emesis, chest pain, dyspnea, palpitations, abd pain, diarrhea, urinary symptoms, skin color changes or rashes, or LE edema     MEDICATIONS  (STANDING):  aspirin  chewable 81 milliGRAM(s) Oral daily  baclofen 20 milliGRAM(s) Oral daily  buDESOnide    Inhalation Suspension 0.5 milliGRAM(s) Inhalation two times a day  dextrose 5%. 1000 milliLiter(s) (50 mL/Hr) IV Continuous <Continuous>  dextrose 5%. 1000 milliLiter(s) (100 mL/Hr) IV Continuous <Continuous>  dextrose 50% Injectable 25 Gram(s) IV Push once  dextrose 50% Injectable 12.5 Gram(s) IV Push once  dextrose 50% Injectable 25 Gram(s) IV Push once  diltiazem    milliGRAM(s) Oral daily  enoxaparin Injectable 40 milliGRAM(s) SubCutaneous every 24 hours  glucagon  Injectable 1 milliGRAM(s) IntraMuscular once  insulin lispro (ADMELOG) corrective regimen sliding scale   SubCutaneous three times a day before meals  insulin lispro (ADMELOG) corrective regimen sliding scale   SubCutaneous at bedtime  ipratropium    for Nebulization 500 MICROGram(s) Nebulizer every 6 hours  metoprolol succinate ER 25 milliGRAM(s) Oral daily  pantoprazole    Tablet 40 milliGRAM(s) Oral before breakfast  sodium chloride 0.9%. 1000 milliLiter(s) (75 mL/Hr) IV Continuous <Continuous>    MEDICATIONS  (PRN):  acetaminophen     Tablet .. 650 milliGRAM(s) Oral every 6 hours PRN Temp greater or equal to 38C (100.4F), Mild Pain (1 - 3)  albuterol    0.083% 2.5 milliGRAM(s) Nebulizer every 6 hours PRN Shortness of Breath and/or Wheezing  dextrose Oral Gel 15 Gram(s) Oral once PRN Blood Glucose LESS THAN 70 milliGRAM(s)/deciliter  ondansetron Injectable 4 milliGRAM(s) IV Push once PRN Nausea and/or Vomiting        REVIEW OF SYSTEMS: As indicated above; otherwise, negative    VITAL SIGNS:  T(F): 98.6 (05-10-23 @ 04:54), Max: 98.6 (05-09-23 @ 10:37)  HR: 90 (05-10-23 @ 05:03) (70 - 100)  BP: 112/85 (05-10-23 @ 04:54) (112/85 - 165/105)  RR: 20 (05-10-23 @ 04:54) (18 - 20)  SpO2: 98% (05-10-23 @ 05:03) (95% - 100%)    PHYSICAL EXAM:  General: NAD, well-groomed, well-developed  Eyes: Pupils equal, round, reactive to light.   ENMT: Moist mucous membranes  Neck: Supple, no JVD, no tender lymphadenopathy   Chest: Clear to auscultation bilaterally; no rales, rhonchi, or wheezing  Heart: Regular rate and rhythm; normal S1 and S2; no murmurs, rubs, or gallops  Abdomen: Firm, nontender, mildly distended, normoactive bowel sounds   Extremities: warm, well perfused, 1+ lower extremity edema bilaterally   Nervous System: AOx3, no focal neurological deficits   Psych: Appropriate affect    LABS:                        13.4   7.65  )-----------( 293      ( 10 May 2023 06:48 )             42.4     10 May 2023 06:48    141    |  104    |  10     ----------------------------<  111    3.9     |  24     |  0.38     Ca    8.6        10 May 2023 06:48  Phos  2.8       10 May 2023 06:48  Mg     2.0       10 May 2023 06:48    TPro  6.0    /  Alb  3.5    /  TBili  0.4    /  DBili  x      /  AST  14     /  ALT  15     /  AlkPhos  54     10 May 2023 06:48  PT/INR - ( 10 May 2023 06:48 )   PT: 17.2 sec;   INR: 1.48 ratio         PTT - ( 10 May 2023 06:48 )  PTT:31.8 sec  CAPILLARY BLOOD GLUCOSE      POCT Blood Glucose.: 119 mg/dL (09 May 2023 22:35)  POCT Blood Glucose.: 115 mg/dL (09 May 2023 17:41)    BLOOD CULTURE    RADIOLOGY & ADDITIONAL TESTS:    Imaging Personally Reviewed:  [X ] YES     Consultant(s) Notes Reviewed:  Yes    Care Discussed with Consultants/Other Providers: Yes Patient is a 68y old  Male who presents with a chief complaint of hypoxia (09 May 2023 15:24)     INTERVAL HPI/OVERNIGHT EVENTS:  - No acute events overnight    SUBJECTIVE  - Patient seen and evaluated at bedside. Patient states that he has slight abdominal discomfort around site of procedure. Patient states that the pain has improved since yesterday. Patient denies any fever, chills, chest pain, shortness of breath, nausea, vomiting, abdominal pain, constipation, or diarrhea.    MEDICATIONS  (STANDING):  aspirin  chewable 81 milliGRAM(s) Oral daily  baclofen 20 milliGRAM(s) Oral daily  buDESOnide    Inhalation Suspension 0.5 milliGRAM(s) Inhalation two times a day  dextrose 5%. 1000 milliLiter(s) (50 mL/Hr) IV Continuous <Continuous>  dextrose 5%. 1000 milliLiter(s) (100 mL/Hr) IV Continuous <Continuous>  dextrose 50% Injectable 25 Gram(s) IV Push once  dextrose 50% Injectable 12.5 Gram(s) IV Push once  dextrose 50% Injectable 25 Gram(s) IV Push once  diltiazem    milliGRAM(s) Oral daily  enoxaparin Injectable 40 milliGRAM(s) SubCutaneous every 24 hours  glucagon  Injectable 1 milliGRAM(s) IntraMuscular once  insulin lispro (ADMELOG) corrective regimen sliding scale   SubCutaneous three times a day before meals  insulin lispro (ADMELOG) corrective regimen sliding scale   SubCutaneous at bedtime  ipratropium    for Nebulization 500 MICROGram(s) Nebulizer every 6 hours  metoprolol succinate ER 25 milliGRAM(s) Oral daily  pantoprazole    Tablet 40 milliGRAM(s) Oral before breakfast  sodium chloride 0.9%. 1000 milliLiter(s) (75 mL/Hr) IV Continuous <Continuous>    MEDICATIONS  (PRN):  acetaminophen     Tablet .. 650 milliGRAM(s) Oral every 6 hours PRN Temp greater or equal to 38C (100.4F), Mild Pain (1 - 3)  albuterol    0.083% 2.5 milliGRAM(s) Nebulizer every 6 hours PRN Shortness of Breath and/or Wheezing  dextrose Oral Gel 15 Gram(s) Oral once PRN Blood Glucose LESS THAN 70 milliGRAM(s)/deciliter  ondansetron Injectable 4 milliGRAM(s) IV Push once PRN Nausea and/or Vomiting        REVIEW OF SYSTEMS: As indicated above; otherwise, negative    VITAL SIGNS:  T(F): 98.6 (05-10-23 @ 04:54), Max: 98.6 (05-09-23 @ 10:37)  HR: 90 (05-10-23 @ 05:03) (70 - 100)  BP: 112/85 (05-10-23 @ 04:54) (112/85 - 165/105)  RR: 20 (05-10-23 @ 04:54) (18 - 20)  SpO2: 98% (05-10-23 @ 05:03) (95% - 100%)    PHYSICAL EXAM:  General: NAD, well-groomed, well-developed  Eyes: Pupils equal, round, reactive to light.   ENMT: Moist mucous membranes  Neck: Supple, no JVD, no tender lymphadenopathy   Chest: Clear to auscultation bilaterally; no rales, rhonchi, or wheezing  Heart: Regular rate and rhythm; normal S1 and S2; no murmurs, rubs, or gallops  Abdomen: Firm, nontender, mildly distended, normoactive bowel sounds   Extremities: warm, well perfused, 1+ lower extremity edema bilaterally   Nervous System: AOx3, no focal neurological deficits   Psych: Appropriate affect    LABS:                        13.4   7.65  )-----------( 293      ( 10 May 2023 06:48 )             42.4     10 May 2023 06:48    141    |  104    |  10     ----------------------------<  111    3.9     |  24     |  0.38     Ca    8.6        10 May 2023 06:48  Phos  2.8       10 May 2023 06:48  Mg     2.0       10 May 2023 06:48    TPro  6.0    /  Alb  3.5    /  TBili  0.4    /  DBili  x      /  AST  14     /  ALT  15     /  AlkPhos  54     10 May 2023 06:48  PT/INR - ( 10 May 2023 06:48 )   PT: 17.2 sec;   INR: 1.48 ratio         PTT - ( 10 May 2023 06:48 )  PTT:31.8 sec  CAPILLARY BLOOD GLUCOSE      POCT Blood Glucose.: 119 mg/dL (09 May 2023 22:35)  POCT Blood Glucose.: 115 mg/dL (09 May 2023 17:41)    BLOOD CULTURE    RADIOLOGY & ADDITIONAL TESTS:    Imaging Personally Reviewed:  [X ] YES     Consultant(s) Notes Reviewed:  Yes    Care Discussed with Consultants/Other Providers: Yes

## 2023-05-10 NOTE — DISCHARGE NOTE NURSING/CASE MANAGEMENT/SOCIAL WORK - NSDCPEFALRISK_GEN_ALL_CORE
For information on Fall & Injury Prevention, visit: [email or web address] OR  [email or web address] OR  https://www.cdc.gov/steadi/patient.html

## 2023-05-10 NOTE — DISCHARGE NOTE PROVIDER - CARE PROVIDERS DIRECT ADDRESSES
,[email or web address]

## 2023-05-10 NOTE — DISCHARGE NOTE PROVIDER - NSDCMRMEDTOKEN_GEN_ALL_CORE_FT
aspirin 81 mg oral tablet: orally once a day (at bedtime) last dose 5/1/23  baclofen 10 mg oral tablet: 2 orally once a day  budesonide 0.5 mg/2 mL inhalation suspension: 2 by nebulizer 2 times a day  CoQ10 300 mg oral capsule: 1 orally once a day  DilTIAZem (Eqv-Cardizem CD) 120 mg/24 hours oral capsule, extended release: 1 tab(s) orally once a day one on odd days and two on even days  formoterol 20 mcg/2 mL inhalation solution: 2 inhaled 2 times a day  metoprolol succinate 25 mg oral tablet, extended release: 1 orally once a day (at bedtime)  Pepcid 40 mg oral tablet: 1 orally once a day (at bedtime)  Protonix 40 mg oral delayed release tablet: 1 orally once a day  Turmeric: 1000 mcg one tablet twice daily  Vitamin B 12: one tab oral twice daily  Vitamin D: 50 mcg one tab oral twice daily   aspirin 81 mg oral tablet, chewable: 1 tab(s) orally once a day  baclofen 10 mg oral tablet: 2 orally once a day  budesonide 0.5 mg/2 mL inhalation suspension: 2 by nebulizer 2 times a day  CoQ10 300 mg oral capsule: 1 orally once a day  DilTIAZem (Eqv-Cardizem CD) 120 mg/24 hours oral capsule, extended release: 1 tab(s) orally once a day one on odd days and two on even days  formoterol 20 mcg/2 mL inhalation solution: 2 inhaled 2 times a day  metoprolol succinate 25 mg oral tablet, extended release: 1 orally once a day (at bedtime)  Pepcid 40 mg oral tablet: 1 orally once a day (at bedtime)  Protonix 40 mg oral delayed release tablet: 1 orally once a day  Turmeric: 1000 mcg one tablet twice daily  Vitamin B 12: one tab oral twice daily  Vitamin D: 50 mcg one tab oral twice daily

## 2023-05-10 NOTE — PROGRESS NOTE ADULT - ATTENDING COMMENTS
68 year old male, with a history of ALS (diagnosed 2017), ARELY on BiPAP, HTN, AF s/p Ablation 2010 not on AC presented for percutaneous G tube placement.    Tube placed. Mild hypoxia post procedure resolved.    Per Neurologist, feeds to be started as an outpatient by him.     D/c home w f/u. D/c time 35 mins.

## 2023-05-10 NOTE — DISCHARGE NOTE PROVIDER - PROVIDER TOKENS
PROVIDER:[TOKEN:[[id number]],FOLLOWUP:[1 week],ESTABLISHEDPATIENT:[T]],PROVIDER:[TOKEN:[[id number]],FOLLOWUP:[1 week],ESTABLISHEDPATIENT:[T]]

## 2023-05-10 NOTE — DISCHARGE NOTE PROVIDER - NSDCCPCAREPLAN_GEN_ALL_CORE_FT
PRINCIPAL DISCHARGE DIAGNOSIS  Diagnosis: Acute respiratory failure with hypoxia  Assessment and Plan of Treatment: You came to the hospital to have the feeding tube placed by IR. After it was placed your oxygen requirments increased for a short period of time. As a result, we ordered a chest xray, which showed atelectasis. Atelectasis is when a portion of your lung collapses on itself, which can occur following a surgical procedure. Overtime, your lungs re-expanded and you were no longer in need of any additional oxygen other than your BiPAP machine. Please follow up with your primary care physician in 1-2 weeks for further management.      SECONDARY DISCHARGE DIAGNOSES  Diagnosis: Encounter for gastrojejunal tube placement  Assessment and Plan of Treatment: You came to the hospital to have the feeding tube placed by IR. After it was placed the IR physician wanted to re-evalaute it in the morning. As of this afternoon, IR has cleared you to use the feeding tube. Please follow up with your neurologist and primary care physician as to when you should start using it. If the tube becomes displaced, you have increased pain around the site of the feeding tube, or you develop severe abdominal pain please call your primary care physician or return to the hospital for further evaluation and management.

## 2023-05-10 NOTE — DISCHARGE NOTE NURSING/CASE MANAGEMENT/SOCIAL WORK - PATIENT PORTAL LINK FT
You can access the FollowMyHealth Patient Portal offered by [facility] by registering at the following website: [email or web address]. By joining [employer]’s FollowMyHealth portal, you will also be able to view your health information using other applications (apps) compatible with our system.

## 2023-05-10 NOTE — PROGRESS NOTE ADULT - PROBLEM SELECTOR PLAN 4
Baclofen daily for Pain  rest as above Baclofen daily for Pain  rest as above  - should f/u outpatient with his neurologist Dr. Lyn

## 2023-05-11 PROBLEM — N20.0 CALCULUS OF KIDNEY: Chronic | Status: ACTIVE | Noted: 2023-01-01

## 2023-05-11 PROBLEM — G47.33 OBSTRUCTIVE SLEEP APNEA (ADULT) (PEDIATRIC): Chronic | Status: ACTIVE | Noted: 2023-01-01

## 2023-05-11 PROBLEM — I10 ESSENTIAL (PRIMARY) HYPERTENSION: Chronic | Status: ACTIVE | Noted: 2023-01-01

## 2023-05-11 PROBLEM — J41.1 MUCOPURULENT CHRONIC BRONCHITIS: Chronic | Status: ACTIVE | Noted: 2023-01-01

## 2023-05-11 PROBLEM — M19.90 UNSPECIFIED OSTEOARTHRITIS, UNSPECIFIED SITE: Chronic | Status: ACTIVE | Noted: 2023-01-01

## 2023-05-11 PROBLEM — K21.9 GASTRO-ESOPHAGEAL REFLUX DISEASE WITHOUT ESOPHAGITIS: Chronic | Status: ACTIVE | Noted: 2023-01-01

## 2023-05-11 PROBLEM — Z86.79 PERSONAL HISTORY OF OTHER DISEASES OF THE CIRCULATORY SYSTEM: Chronic | Status: ACTIVE | Noted: 2023-01-01

## 2023-05-11 PROBLEM — M54.50 LOW BACK PAIN, UNSPECIFIED: Chronic | Status: ACTIVE | Noted: 2023-01-01

## 2023-05-13 PROBLEM — R26.9 UNSPECIFIED ABNORMALITIES OF GAIT AND MOBILITY: Chronic | Status: ACTIVE | Noted: 2023-01-01

## 2023-05-13 PROBLEM — K21.9 GASTRO-ESOPHAGEAL REFLUX DISEASE WITHOUT ESOPHAGITIS: Chronic | Status: ACTIVE | Noted: 2023-01-01

## 2023-05-13 PROBLEM — Z87.11 PERSONAL HISTORY OF PEPTIC ULCER DISEASE: Chronic | Status: ACTIVE | Noted: 2023-01-01

## 2023-05-13 PROBLEM — G12.21 AMYOTROPHIC LATERAL SCLEROSIS: Chronic | Status: ACTIVE | Noted: 2023-01-01

## 2023-05-13 PROBLEM — J45.909 UNSPECIFIED ASTHMA, UNCOMPLICATED: Chronic | Status: ACTIVE | Noted: 2023-01-01

## 2023-05-13 PROBLEM — K59.09 OTHER CONSTIPATION: Chronic | Status: ACTIVE | Noted: 2023-01-01

## 2023-05-13 PROBLEM — Z87.898 PERSONAL HISTORY OF OTHER SPECIFIED CONDITIONS: Chronic | Status: ACTIVE | Noted: 2023-01-01

## 2023-05-13 PROBLEM — U07.1 COVID-19: Chronic | Status: ACTIVE | Noted: 2023-01-01

## 2023-05-16 PROBLEM — R21 RASH: Status: ACTIVE | Noted: 2023-01-01

## 2023-05-22 NOTE — HISTORY OF PRESENT ILLNESS
[FreeTextEntry2] : 68 year old male being seen for a post hospitalization visit, and  follow, up after discharge from [facility]. Pt was admitted on 05/09/2023 for Scheduled G- Tube placement and discharged home on 05/10/2023. Discharge medications from the discharge summary were reviewed and reconciled with the current medication list and medications in home:\par some  of the history obtained from family member  wife  Ray \par interval events reviewed   and addressed \par as per wife  dramatic decline  of  status  unable  to feed himself with  left hand \par  wife has  issues getting to  neuro to discuss  and unable to go further to see specialist  \par diet regular  appetite  good    feeding tube  as per ALS  specialists  done   not in active use \par  eye gaze  system  should  be used  in near future \par dysphagia  none reported  currently \par Weight overweight in  wheelchair \par falls none  recently \par Behavior good   at present declines  meds \par Mood can get  frustrated  but  easily  calmed down  but seems more irritable today \par  memory  good  \par  sleep   good \par sensory deficits  wears  glasses \par pain  only when not moved \par Medication refills done and reconciliation  done \par Goals of care discussed and completed \par  uses BIPAP  about 12 hours  a day  \par retired  \par    wife  no children

## 2023-05-31 PROBLEM — G89.18 POST-OP PAIN: Status: ACTIVE | Noted: 2023-01-01

## 2023-06-08 NOTE — REASON FOR VISIT
[Home] : at home, [unfilled] , at the time of the visit. [Medical Office: ([facility])___] : at the medical office located in  [Spouse] : spouse [Consultation] : a consultation visit [FreeTextEntry1] : gastrostomy tube

## 2023-06-08 NOTE — ASSESSMENT
[Other: _____] : [unfilled] [FreeTextEntry1] : This is a 67 y/o male with pmhx of afib (20 years ago s/p ablation), ARELY (uses bipap), and ALS who presents today for consultation for gastrostomy tube placement.\par \par 1. Neurologic Dysphagia\par - pt dx with ALS in 2019\par - evaluated by neuro recently and found to have poor respiratory function, so concern that this could compromise swallowing function affecting nutrition.  Recommended to have gastrostomy tube placed.\par - reviewed recent CT scan, d/w patient and his wife. \par - I reviewed the gastrostomy tube placement procedure, including the risks, benefits, alternatives, and expected post procedure course. \par - pt takes baby aspirin for prophylaxis, denies hx of cardiac stents.  Instructed to hold x 5 days prior to procedure\par - will make arrangements\par - pt to f/u with Dr. Lyn/nutrition team to discuss possible feedings\par \par 2.  ARELY/Neurogenic respiratory failure\par - pt wheelchair bound, only has limited mobility\par - uses BIPAP at night and prn during day\par - ARELY precautions per anesthesia\par \par I have provided the patient the opportunity to ask questions and have answered them to their satisfaction.  They are encouraged to contact our office with any further questions, concerns, or issues.\par \par

## 2023-06-08 NOTE — HISTORY OF PRESENT ILLNESS
[FreeTextEntry1] : This is a 67 y/o male with pmhx of afib (20 years ago s/p ablation), ARELY (uses bipap), and ALS who presents today for consultation for gastrostomy tube placement.\par \par Pt diagnosed with ALS on 2019, recently with progressive worsening of symptoms.  He is wheelchair bound, only limited mobility, can only move with left hand fingers. He has occasional drooling and choking.  Uses BIPAP at night and prn. \par \par At recent visit with neurology, pulmonary function was assessed by respiratory therapy and spirometry. Patient's respiratory function is very poor and could also compromise swallowing function affecting nutrition: very low FVC; recommend as per speech and swallow to have G-tube placed, to be ready to supplement feeds. Referred to IR by neurology, Dr. Lyn, for gastrostomy tube placement. \par \par Neuro: Eboni\par

## 2023-06-08 NOTE — DATA REVIEWED
[FreeTextEntry1] : \par \par EXAM: [id number] - CT ABDOMEN AND PELVIS OC - ORDERED BY: ALEJANDRINA MARTIN\par \par \par PROCEDURE DATE: 04/13/2023\par \par \par \par INTERPRETATION: CLINICAL INFORMATION: PEG tube planning CT scan\par \par COMPARISON: None.\par \par CONTRAST/COMPLICATIONS:\par IV Contrast: NONE\par Oral Contrast: Omnipaque 300\par Complications: None reported at time of study completion\par \par PROCEDURE:\par CT of the Abdomen and Pelvis was performed.\par Sagittal and coronal reformats were performed.\par \par FINDINGS:\par LOWER CHEST: left lower lobe atelectasis\par \par LIVER: Within normal limits.\par BILE DUCTS: Normal caliber.\par GALLBLADDER: Within normal limits.\par SPLEEN: Within normal limits.\par PANCREAS: Within normal limits.\par ADRENALS: Within normal limits.\par KIDNEYS/URETERS: Bilateral exophytic high density cysts involving the kidneys with a 5.9 cm) lesion in the 2.1 cm left renal lesion\par \par BLADDER: Within normal limits.\par REPRODUCTIVE ORGANS: Prostate within normal limits.\par \par BOWEL: No bowel obstruction. The stomach abuts the anterior abdominal wall without colonic interpositioning scattered diverticulum sigmoid colon Appendix is normal.\par PERITONEUM: No ascites.\par VESSELS: Within normal limits.\par RETROPERITONEUM/LYMPH NODES: No lymphadenopathy.\par ABDOMINAL WALL: Within normal limits.\par BONES: Mild degenerative changes\par \par IMPRESSION:\par Noncharacterized bilateral high density renal lesions possibly hemorrhagic cyst but not characterize. If the patient does not have prior exams for comparison targeted ultrasound should be considered.\par \par Otherwise unremarkable abdominal pelvic CT\par

## 2023-06-08 NOTE — PHYSICAL EXAM
[Alert] : alert [No Acute Distress] : no acute distress [Well Nourished] : well nourished [Well Developed] : well developed [No Respiratory Distress] : no respiratory distress [Oriented x3] : oriented to person, place, and time [Normal Insight/Judgement] : insight and judgment were intact [Normal Affect] : the affect was normal [Normal Mood] : the mood was normal [Recent Memory Normal] : recent memory was not impaired [Remote Memory Normal] : remote memory was not impaired [Completely disabled. Cannot carry on any selfcare. Totally confined to bed or chair] : Completely disabled. Cannot carry on any selfcare. Totally confined to bed or chair [de-identified] : presents in motorized wheelchair, difficulty with speech

## 2023-07-11 NOTE — REVIEW OF SYSTEMS
[Fever] : no fever [Chills] : no chills [Feeling Poorly] : feeling poorly [Suicidal] : not suicidal [Sleep Disturbances] : sleep disturbances [Anxiety] : anxiety [Depression] : depression [Change In Personality] : personality change [Emotional Problems] : emotional problems [Confused or Disoriented] : no confusion [Memory Lapses or Loss] : no memory loss [Decr. Concentrating Ability] : no decrease in concentrating ability [Difficulty with Language] : no ~M difficulty with language [Facial Weakness] : facial weakness [Arm Weakness] : arm weakness [Hand Weakness] :  hand weakness [Leg Weakness] : leg weakness [Difficulty Writing] : difficulty writing [Difficulties in Speech] : speech difficulties [Numbness] : no numbness [Tingling] : no tingling [Seizures] : no convulsions [Dizziness] : no dizziness [Fainting] : no fainting [Migraine Headache] : no migraine headache [Inability to Walk] : inability to walk [Earache] : no earache [Loss Of Hearing] : no hearing loss [Negative] : Respiratory

## 2023-07-11 NOTE — HISTORY OF PRESENT ILLNESS
[FreeTextEntry1] : Has a gastrostomy tube but not using it; uses a AAV ; scheduled for updates of software for better eye directed use for environment control and to show screen to viewers on the other side of the main screen

## 2023-07-11 NOTE — PHYSICAL EXAM
[Person] : oriented to person [Place] : oriented to place [Time] : oriented to time [Short Term Intact] : short term memory intact [Remote Intact] : remote memory intact [Span Intact] : the attention span was normal [Concentration Intact] : normal concentrating ability [Visual Intact] : visual attention was ~T not ~L decreased [Fluency] : fluency intact [Comprehension] : comprehension intact [Cranial Nerves Optic (II)] : visual acuity intact bilaterally,  visual fields full to confrontation, pupils equal round and reactive to light [Cranial Nerves Oculomotor (III)] : extraocular motion intact [Cranial Nerves Trigeminal (V)] : facial sensation intact symmetrically [Cranial Nerves Facial (VII)] : face symmetrical [Cranial Nerves Vestibulocochlear (VIII)] : hearing was intact bilaterally [Cranial Nerves Glossopharyngeal (IX)] : tongue and palate midline [Cranial Nerves Accessory (XI - Cranial And Spinal)] : head turning and shoulder shrug symmetric [Cranial Nerves Hypoglossal (XII)] : there was no tongue deviation with protrusion [Cranial Nerves Facial Peripheral - Right Only] : peripheral 7th nerve weakness [Cranial Nerves Right Facial: House Grade ___(1-6)] : grade V (severe, 20%) facial nerve function [Cranial Nerves Facial Peripheral - Left Only] : peripheral 7th nerve weakness [Cranial Nerves Left Facial: House Grade ___(1-6)] : grade V (severe, 20%) facial nerve function [Motor Handedness Right-Handed] : the patient is right hand dominant [Motor Strength Upper Extremities Bilaterally] : there was weakness in both upper extremities [Motor Strength Lower Extremities Bilaterally] : there was weakness in both lower extremities [2] : fingers flexion 2/5 [1] : knee extension 1/5 [Sensation Tactile Decrease] : light touch was intact [Sensation Pain / Temperature Decrease] : pain and temperature was intact [Vibration Decrease Leg / Foot Both Ankles] : decreased at both ankles [Vibration Decrease Leg / Foot Toes Both Feet] : decreased at the toes of both feet  [Position Sensation Decrease Leg/Foot At Level Of Toes] : impaired at the toes in both legs [Limited Balance] : the patient's balance was impaired [Dysdiadochokinesia Bilaterally] : not present [1+] : Triceps right 1+ [0] : Ankle jerk left 0 [FreeTextEntry8] : cannot sit stand  [Sclera] : the sclera and conjunctiva were normal [PERRL With Normal Accommodation] : pupils were equal in size, round, reactive to light, with normal accommodation [Extraocular Movements] : extraocular movements were intact

## 2023-07-28 NOTE — PHYSICAL EXAM
[Normal Sclera/Conjunctiva] : normal sclera/conjunctiva [Normal Outer Ear/Nose] : the ears and nose were normal in appearance [Normal Oropharynx] : the oropharynx was normal [No JVD] : no jugular venous distention [No Respiratory Distress] : no respiratory distress [Normal Rate] : heart rate was normal  [Clear to Auscultation] : lungs were clear to auscultation bilaterally [Regular Rhythm] : with a regular rhythm [Normal Bowel Sounds] : normal bowel sounds [Non Tender] : non-tender [Soft] : abdomen soft [Patient Refused] : rectal exam was refused by the patient [No CVA Tenderness] : no ~M costovertebral angle tenderness [No Skin Lesions] : no skin lesions [Oriented x3] : oriented to person, place, and time [Foot Ulcers] : no foot ulcers [de-identified] : robust   male  in recliner chair  but significantly with more  motoric  issues  unable to move his left  hand fngers  [de-identified] : poor air entry  [de-identified] : obese  peg site clean  2 anchors  over  the top part  tube  seems patent   [de-identified] : in wheelchair  [de-identified] : 0/5  lower extremities    [de-identified] : cooperates  flat affect

## 2023-07-28 NOTE — HISTORY OF PRESENT ILLNESS
[Patient] : patient [Spouse] : spouse [FreeTextEntry1] : ALS [FreeTextEntry2] :  68 year  old male with ALS  seen for a follow, up \par  interval events reviewed   and addressed \par  Nutritional/dietary needs discussed and addressed.\par  ALS specialist   input  appreciated  \par Pulmonary function was assessed by respiratory therapy and spirometry. Patient's respiratory function is \par 97%    he has a PEG tube in place but he is not using it.\par  feeding  is getting more difficult and  patient c/o lack of energy  discussed  use of PEG tube  \par patient  does get tired when he swallows as his voice gets hoarse with a few sips.\par  Modified consistency of foods and liquids recommended \par he has a device for communication and when he gets the device set up for occupational therapy needs he is also going to have it set up so that. It reads on the other side.\par  PT/OT recommends: Has private home PT and outpatient PT there are no physical therapy needs\par Has software installation scheduled for For controlling his power wheelchair and controlling environmental devices around him such as his lights \par as per wife  again  decline  of  status  unable  to feed himself with  left hand \par  question  of use of riluzone  stopped a s per wife  will reach out to  neuro  with  directions \par dysphagia  none reported  currently \par Weight overweight in  wheelchair \par falls none  recently \par Behavior good   at present declines  meds \par Mood can get  frustrated  but  easily  calmed down  but seems more irritable today \par  memory  good  \par  sleep   good \par sensory deficits  wears  glasses \par pain  only when not moved \par Medication refills done and reconciliation  done \par Goals of care discussed and completed \par  uses BIPAP  about 12 hours  a day  \par retired  \par    wife  no children

## 2023-08-07 PROBLEM — T14.8XXA OPEN WOUND OF SKIN: Status: ACTIVE | Noted: 2023-01-01

## 2023-09-14 PROBLEM — J45.909 ASTHMA: Status: ACTIVE | Noted: 2021-04-13

## 2023-09-14 PROBLEM — G47.33 OSA (OBSTRUCTIVE SLEEP APNEA): Status: ACTIVE | Noted: 2021-11-09

## 2023-09-14 PROBLEM — K21.9 GERD (GASTROESOPHAGEAL REFLUX DISEASE): Status: ACTIVE | Noted: 2023-01-01

## 2023-09-14 PROBLEM — R94.2 OBSTRUCTIVE PATTERN PRESENT ON PULMONARY FUNCTION TESTING: Status: ACTIVE | Noted: 2021-07-02

## 2023-09-14 PROBLEM — K21.9 LPRD (LARYNGOPHARYNGEAL REFLUX DISEASE): Status: ACTIVE | Noted: 2021-04-13

## 2023-09-14 PROBLEM — R94.2 RESTRICTIVE PATTERN PRESENT ON PULMONARY FUNCTION TESTING: Status: ACTIVE | Noted: 2021-07-02

## 2023-09-14 PROBLEM — R06.02 SOB (SHORTNESS OF BREATH): Status: ACTIVE | Noted: 2021-04-13

## 2023-09-14 PROBLEM — J30.9 ALLERGIC RHINITIS: Status: ACTIVE | Noted: 2021-04-13

## 2023-09-14 NOTE — H&P PST ADULT - NS PRO AD PATIENT TYPE ON CHART
Post-Care Instructions: I reviewed with the patient in detail post-care instructions. Patient is to wear sunprotection, and avoid picking at any of the treated lesions. Pt may apply Vaseline to crusted or scabbing areas.
Show Applicator Variable?: Yes
Detail Level: Detailed
Consent: The patient's consent was obtained including but not limited to risks of crusting, scabbing, blistering, scarring, darker or lighter pigmentary change, recurrence, incomplete removal and infection.
Render Post-Care Instructions In Note?: no
Duration Of Freeze Thaw-Cycle (Seconds): 0
Health Care Proxy (HCP)/Medical Orders for Life-Sustaining Treatment (MOLST)/Living Will

## 2023-09-15 PROBLEM — R68.89: Status: ACTIVE | Noted: 2023-01-01

## 2023-09-15 PROBLEM — Z23 NEED FOR INFLUENZA VACCINATION: Status: ACTIVE | Noted: 2023-01-01

## 2023-09-15 PROBLEM — Z23 ENCOUNTER FOR IMMUNIZATION: Status: ACTIVE | Noted: 2020-09-15

## 2023-09-22 NOTE — CHART NOTE - NSCHARTNOTEFT_GEN_A_CORE
09/22/2023   [facility]  N/A  For questions about this resource list or additional care needs, please contact your primary care clinic or care manager.  Phone: [phone]   Email: N/A   Address: [address] [place] [address]   Hours: N/A        Financial Stability       Rent and mortgage payment assistance  1  [facility] - Emergency Assistance Program (EAP) Distance: 2.35 miles      In-Person, Phone/Virtual   [address] [id number]  Language: American Sign Language, English  Hours: Mon - Fri 8:00 AM - 4:00 PM  Fees: Free   Phone: (416) 614-3387 Email: [email or web address]. Website: https://www.[employer]./residents#human-services     2  [facility] Distance: 3.04 miles      In-Person   [address] [place] [address]  Language: English  Hours: Mon - Fri 8:00 AM - 4:00 PM  Fees: Free   Phone: (277) 878-9937 Email: info@[facility] Website: [email or web address].[employer]/          Food and Nutrition       Food pantry  3  [facility] - Food Distribuition Distance: 1.5 miles      Pickup   [id number] [place] [address]  Language: English, Iraqi  Hours: Tue 11:00 AM - 12:00 PM , Tue 12:00 PM - 3:30 PM Appt. Only, Thu 12:00 PM - 3:30 PM Appt. Only  Fees: Free   Phone: (721) 829-9602 Email: Janeen@[facility].org Website: http://[facility]aryAxisMobile[facility].org/[facility]/     4  Community Emergency Assistance Programs (CEAP) - [facility] - Food Market Distance: 2.35 miles      Pickup   [address] [place] [address]  Language: English, Iraqi  Hours: Mon - Tue 9:00 AM - 4:30 PM , Wed 9:00 AM - 7:00 PM , Thu 9:00 AM - 4:30 PM  Fees: Free   Phone: (330) 584-9758 Email: info@[employer] Website: http://www.[employer].org     SNAP application assistance  Edgar Verdin  Patient s/p successful G-Tube placement yesterday in IR. OK to use G-tube today after [address] [facility] Distance: 2.35 miles      In-Person, Phone/Virtual   [address] [place] [address]  Language: American Sign Language, English  Hours: Mon - Fri 8:00 AM - 4:00 PM  Fees: Free   Phone: (615) 949-8476 Email: [email or web address]. Website: https://www.[facility]./residents#human-services     6  [facility] Distance: 2.82 miles      Phone/Virtual   [address] [place] [address]  Language: English, South Korean  Hours: Mon 9:00 AM - 1:00 PM , Tue - Thu 9:00 AM - 4:00 PM , Fri 9:00 AM - 1:00 PM  Fees: Free   Phone: (317) 914-1768 Email: info@[facility].com.[employer] Website: http://www.[facility].com.org/     Soup kitchen or free meals  7  [facility] and Fishes Distance: 2.25 miles      Pickup   [address] [place] [address]  Language: English  Hours: Mon 12:00 PM - 1:00 PM , Wed 12:00 PM - 1:00 PM , Fri 12:00 PM - 1:00 PM  Fees: Free   Phone: (429) 261-9473 Ext.107 Email: admin@[facility] Website: http://[facility].org     8  [facility] - Family Table Meal Distance: 4.18 miles      In-Person, Pickup   [address] [place] [address]  Language: English  Hours: Thu 5:30 PM - 6:30 PM  Fees: Free   Phone: (323) 386-9867 Email: office@[employer].org Website: http://www.[employer].org          Important Numbers & Websites       Emergency Services   911  City Services   311  Poison Control   (854) 250-5644  Suicide Prevention Lifeline   (985) 555-1932 (TALK)  Child Abuse Hotline   (517) 764-4986 (4-A-Child)  Sexual Assault Hotline   (103) 465-6275 (HOPE)  National Runaway Safeline   (568) 951-4505 (RUNAWAY)  All-Options Talkline   (400) 254-7242  Substance Abuse Referral   (991) 483-8830 (HELP)

## 2023-10-19 NOTE — H&P PST ADULT - NEGATIVE GASTROINTESTINAL SYMPTOMS
no vomiting/no diarrhea Epidermal Autograft Text: The defect edges were debeveled with a #15 scalpel blade.  Given the location of the defect, shape of the defect and the proximity to free margins an epidermal autograft was deemed most appropriate.  Using a sterile surgical marker, the primary defect shape was transferred to the donor site. The epidermal graft was then harvested.  The skin graft was then placed in the primary defect and oriented appropriately.

## 2023-11-02 PROBLEM — F41.9 ANXIETY: Status: ACTIVE | Noted: 2023-01-01

## 2023-11-02 PROBLEM — J41.1 MUCOPURULENT CHRONIC BRONCHITIS: Status: ACTIVE | Noted: 2022-10-04

## 2023-12-02 PROBLEM — R13.10 DYSPHAGIA, UNSPECIFIED TYPE: Status: ACTIVE | Noted: 2023-01-01

## 2023-12-17 PROBLEM — R10.9 FLANK PAIN, ACUTE: Status: ACTIVE | Noted: 2023-01-01

## 2023-12-17 PROBLEM — R10.9 FLANK PAIN: Status: ACTIVE | Noted: 2023-01-01

## 2023-12-17 PROBLEM — R06.00 DYSPNEA: Status: ACTIVE | Noted: 2021-04-11

## 2023-12-26 PROBLEM — R10.9 ABDOMINAL PAIN: Status: ACTIVE | Noted: 2023-01-01

## 2023-12-26 PROBLEM — J96.10: Status: ACTIVE | Noted: 2023-01-01

## 2023-12-26 PROBLEM — Z93.1 PEG (PERCUTANEOUS ENDOSCOPIC GASTROSTOMY) STATUS: Status: ACTIVE | Noted: 2023-01-01

## 2023-12-26 PROBLEM — G12.21 AMYOTROPHIC LATERAL SCLEROSIS: Status: ACTIVE | Noted: 2019-12-23

## 2023-12-27 PROBLEM — Z51.5 HOSPICE CARE: Status: ACTIVE | Noted: 2023-01-01

## 2024-01-22 ENCOUNTER — APPOINTMENT (OUTPATIENT)
Dept: NEUROLOGY | Facility: CLINIC | Age: 69
End: 2024-01-22
